# Patient Record
Sex: FEMALE | Race: OTHER | Employment: UNEMPLOYED | URBAN - METROPOLITAN AREA
[De-identification: names, ages, dates, MRNs, and addresses within clinical notes are randomized per-mention and may not be internally consistent; named-entity substitution may affect disease eponyms.]

---

## 2017-03-02 ENCOUNTER — OFFICE VISIT (OUTPATIENT)
Dept: OBGYN CLINIC | Age: 37
End: 2017-03-02

## 2017-03-02 VITALS
HEIGHT: 65 IN | DIASTOLIC BLOOD PRESSURE: 58 MMHG | SYSTOLIC BLOOD PRESSURE: 110 MMHG | BODY MASS INDEX: 25.99 KG/M2 | WEIGHT: 156 LBS

## 2017-03-02 DIAGNOSIS — N92.6 IRREGULAR PERIODS: ICD-10-CM

## 2017-03-02 DIAGNOSIS — R82.90 BAD ODOR OF URINE: ICD-10-CM

## 2017-03-02 DIAGNOSIS — N89.8 VAGINAL ODOR: ICD-10-CM

## 2017-03-02 DIAGNOSIS — D50.9 IRON DEFICIENCY ANEMIA, UNSPECIFIED IRON DEFICIENCY ANEMIA TYPE: ICD-10-CM

## 2017-03-02 DIAGNOSIS — N97.0 INFERTILITY ASSOCIATED WITH ANOVULATION: Primary | ICD-10-CM

## 2017-03-02 LAB
BILIRUB UR QL STRIP: NEGATIVE
GLUCOSE UR-MCNC: NEGATIVE MG/DL
KETONES P FAST UR STRIP-MCNC: NEGATIVE MG/DL
PH UR STRIP: NORMAL [PH] (ref 4.6–8)
PROT UR QL STRIP: NEGATIVE MG/DL
SP GR UR STRIP: NORMAL (ref 1–1.03)
UA UROBILINOGEN AMB POC: NORMAL (ref 0.2–1)
URINALYSIS CLARITY POC: NORMAL
URINALYSIS COLOR POC: NORMAL
URINE BLOOD POC: NEGATIVE
URINE LEUKOCYTES POC: NEGATIVE
URINE NITRITES POC: NEGATIVE

## 2017-03-02 NOTE — PROGRESS NOTES
Femara followup note  Helen Chang is a [id number],  43 y.o. female OTHER Patient's last menstrual period was 2017. who presents for a follow up from taking Femara in November. She comes to the office today with a history of oligo-ovulation. She and her partner have been attempting pregnancy for one year. She is here to followup on Femara therapy. The patient has a history of oligo-ovulation which is her indication for Femara therapy. She is now on day 12 of this cycle. She has taken Femara, 2.5 mg's on day 3 of the cycle for 5 days. The couple have had intercourse 2 to 4 times per week . She has not had previous Femara therapy in the past.   The patient had no symptoms of ovulation on that dose and returns for followup. The patient has been re-advised of the risks, benefits, and alternatives of Femara therapy. Patient states she did have some cramping with IC. She has also noticed a vaginal discharge and foul odor right before her cycle starts. She has noticed this for several months. She states her hgb was 7 at PCP - hx of gastric sleeve. May need iron infusions - will check today  Complains of foul odor to urine and dysuria. No fever. Sx for this week. No hematuria      Past Medical History:   Diagnosis Date    Abnormal Pap smear of cervix 2016    Neg pap +HPV     history     Acquired hypothyroidism     hyperthyroid, graves disease    Anxiety     recent diagnosis    Constipation     Frequent urination     GERD (gastroesophageal reflux disease)     HSV-2 infection 2014    HX OTHER MEDICAL     compression    Infertility     clomid    Leg pain     Morbid obesity ([address]     PCOS (polycystic ovarian syndrome)     Rapid pulse     Reflux     Sinusitis     Snoring     Thyroid disease     Varicose vein      Past Surgical History:   Procedure Laterality Date    HX GASTRIC BYPASS  10/2/13    HX HEENT  orbital surgery    orbital decompression.       Social History Occupational History    Not on file. Social History Main Topics    Smoking status: Never Smoker    Smokeless tobacco: Never Used    Alcohol use No    Drug use: No    Sexual activity: Yes     Partners: Male     Birth control/ protection: None     Family History   Problem Relation Age of Onset    Hypertension Mother     Diabetes Mother     High Cholesterol Mother     Hypertension Father     Diabetes Father     Heart Disease Father     Stroke Father     Hypertension Maternal Grandmother     Diabetes Maternal Grandmother     Hypertension Maternal Grandfather     Diabetes Maternal Grandfather     Stroke Maternal Grandfather     Hypertension Paternal Grandmother     Diabetes Paternal Grandmother     Stroke Paternal Grandmother     Hypertension Paternal Grandfather     Diabetes Paternal Grandfather        No Known Allergies  Prior to Admission medications    Medication Sig Start Date End Date Taking? Authorizing Provider   BIOTIN PO Take  by mouth. Yes Historical Provider   CALCIUM PO Take  by mouth. Yes Historical Provider   cyanocobalamin (VITAMIN B-12) 1,000 mcg/mL injection 1,000 mcg by IntraMUSCular route once. Yes Historical Provider   ergocalciferol (ERGOCALCIFEROL) 50,000 unit capsule Take 1 Cap by mouth every seven (7) days. 9/20/13  Yes Abhilash Villatoro, NP   ALPRAZolam Geoffry Chill) 0.5 mg tablet Take 0.5 mg by mouth as needed. Yes Historical Provider   levothyroxine (LEVOXYL) 112 mcg tablet Take  by mouth daily (before breakfast).    Yes Historical Provider        Review of Systems: History obtained from the patient  Constitutional: negative for weight loss, fever, night sweats  Breast: negative for breast lumps, nipple discharge, galactorrhea  GI: negative for change in bowel habits, abdominal pain, black or bloody stools  : negative for frequency, dysuria, hematuria, vaginal discharge  MSK: negative for back pain, joint pain, muscle pain  Skin: negative for itching, rash, hives  Psych: negative for anxiety, depression, change in mood    Objective:    Visit Vitals    /58    Ht 5' 5\" (1.651 m)    Wt 156 lb (70.8 kg)    LMP 02/18/2017    BMI 25.96 kg/m2       Physical Exam:     Constitutional  · Appearance: well-nourished, well developed, alert, in no acute distress    HENT  · Head and Face: appears normal    Neck  · Inspection/Palpation: normal appearance, no masses or tenderness  · Lymph Nodes: no lymphadenopathy present  · Thyroid: gland size normal, nontender, no nodules or masses present on palpation    Gastrointestinal  · Abdominal Examination: abdomen non-tender to palpation, normal bowel sounds, no masses present  · Liver and spleen: no hepatomegaly present, spleen not palpable  · Hernias: no hernias identified    Genitourinary  · External Genitalia: normal appearance for age, no discharge present, no tenderness present, no inflammatory lesions present, no masses present, no atrophy present  · Vagina: normal vaginal vault without central or paravaginal defects, no discharge present, no inflammatory lesions present, no masses present  · Bladder: non-tender to palpation  · Urethra: appears normal  · Cervix: normal   · Uterus: normal size, shape and consistency  · Adnexa: no adnexal tenderness present, no adnexal masses present  · Perineum: perineum within normal limits, no evidence of trauma, no rashes or skin lesions present  · Anus: anus within normal limits, no hemorrhoids present  · Inguinal Lymph Nodes: no lymphadenopathy present    Skin  · General Inspection: no rash, no lesions identified    Neurologic/Psychiatric  · Mental Status:  · Orientation: grossly oriented to person, place and time  · Mood and Affect: mood normal, affect appropriate  Results for orders placed or performed in visit on 03/02/17   AMB POC URINALYSIS DIP STICK MANUAL W/O MICRO   Result Value Ref Range    Color (UA POC)      Clarity (UA POC)      Glucose (UA POC) Negative Negative    Bilirubin (UA POC) Negative Negative    Ketones (UA POC) Negative Negative    Specific gravity (UA POC)  1.001 - 1.035    Blood (UA POC) Negative Negative    pH (UA POC)  4.6 - 8.0    Protein (UA POC) Negative Negative mg/dL    Urobilinogen (UA POC) normal 0.2 - 1    Nitrites (UA POC) Negative Negative    Leukocyte esterase (UA POC) Negative Negative       Assessment:  Oligoamenorrhea. Infertility - no conception after 2 years  Dysuria  Vaginal ordor  Hx of anemia  Plan:   Nuswab  Urine culture  See RED - conceived after 2 cycles of Clomid last pregnancy, but weighed much more then and did not have reg cycles  nuswab  prog level, CBC today  Instructions given.

## 2017-03-02 NOTE — PATIENT INSTRUCTIONS
Learning About Planning for Future Pregnancy  How can you plan for pregnancy? Even before you get pregnant, you can help make your pregnancy as healthy as possible. Take these steps:  · See a doctor or certified nurse-midwife for an exam. Talk about the medicines, vitamins, and herbs you take. Discuss any health problems or concerns you have. · Don't take nonsteroidal anti-inflammatory drugs (NSAIDs). Examples of these are ibuprofen and aspirin. They can raise your risk of miscarriage. This risk is higher around the time you conceive or if you use them for more than a week. · Take a daily multivitamin or prenatal vitamin. Make sure it has folic acid. This will lower the chance of having a baby with a birth defect. · Keep track of your menstrual cycle. This is a good idea for a few reasons. It helps you know the best time to try to get pregnant. And it can help your doctor or midwife figure out when your baby is due and how it is growing. · Make healthy choices. Eat well. Avoid caffeine. Or cut back and only have 1 cup of coffee or tea a day. Avoid alcohol, cigarettes, and illegal drugs. Take only the medicines your doctor or midwife says are okay. · Get plenty of exercise. A strong body will make pregnancy and birth easier. It will also help you recover after the birth. And exercise can help improve your mood. What other exams or tests should you have? Before trying to get pregnant, take care of any other health concerns you might have. · If you have diabetes or high blood pressure or you are obese, talk to your doctor before you get pregnant. Together, you can make a plan about how to control these health problems. · Talk with your doctor about any medicines you take. Find out if it is safe to keep taking them while you are pregnant. · See your dentist. Take care of any dental work you may need. · Get any vaccines you might need.  They can help prevent birth defects, miscarriage, or stillbirth that can be caused by infections such as rubella or measles. Ask your doctor how long you should wait after you get a vaccine before you try to get pregnant. · Talk with your doctor about whether to have screening tests for diseases that are passed down through families (genetic disorders). These diseases include:  ¨ Cystic fibrosis. ¨ Sickle cell disease. ¨ Lucio-Sachs disease. If you think you might be pregnant  · You can use a home pregnancy test as soon as the first day of your first missed menstrual period. · As soon as you know you're pregnant, make an appointment with your doctor or certified nurse-midwife. Your first prenatal visit will provide information that can be used to check for any problems as your pregnancy progresses. Where can you learn more? Go to http://jaylene-lor.info/. Enter X535 in the search box to learn more about \"Learning About Planning for Future Pregnancy. \"  Current as of: May 30, 2016  Content Version: 11.1  © 6710-5449 [employer], [employer]. Care instructions adapted under license by [employer] (which disclaims liability or warranty for this information). If you have questions about a medical condition or this instruction, always ask your healthcare professional. [address] any warranty or liability for your use of this information.

## 2017-03-03 LAB
ERYTHROCYTE [DISTWIDTH] IN BLOOD BY AUTOMATED COUNT: 17.1 % (ref 12.3–15.4)
HCT VFR BLD AUTO: 33.2 % (ref 34–46.6)
HGB BLD-MCNC: 10 G/DL (ref 11.1–15.9)
MCH RBC QN AUTO: 21.4 PG (ref 26.6–33)
MCHC RBC AUTO-ENTMCNC: 30.1 G/DL (ref 31.5–35.7)
MCV RBC AUTO: 71 FL (ref 79–97)
PLATELET # BLD AUTO: 335 X10E3/UL (ref 150–379)
PROGEST SERPL-MCNC: 12.8 NG/ML
RBC # BLD AUTO: 4.68 X10E6/UL (ref 3.77–5.28)
WBC # BLD AUTO: 8.9 X10E3/UL (ref 3.4–10.8)

## 2017-03-07 ENCOUNTER — PATIENT MESSAGE (OUTPATIENT)
Dept: OBGYN CLINIC | Age: 37
End: 2017-03-07

## 2017-03-07 LAB
A VAGINAE DNA VAG QL NAA+PROBE: ABNORMAL SCORE
BVAB2 DNA VAG QL NAA+PROBE: ABNORMAL SCORE
C ALBICANS DNA VAG QL NAA+PROBE: POSITIVE
C GLABRATA DNA VAG QL NAA+PROBE: NEGATIVE
MEGA1 DNA VAG QL NAA+PROBE: ABNORMAL SCORE
T VAGINALIS RRNA SPEC QL NAA+PROBE: NEGATIVE

## 2017-03-07 RX ORDER — FLUCONAZOLE 150 MG/1
150 TABLET ORAL DAILY
Qty: 1 TAB | Refills: 0 | Status: SHIPPED | OUTPATIENT
Start: 2017-03-07 | End: 2017-03-08

## 2017-03-07 RX ORDER — METRONIDAZOLE 500 MG/1
500 TABLET ORAL 2 TIMES DAILY
Qty: 14 TAB | Refills: 0 | Status: SHIPPED | OUTPATIENT
Start: 2017-03-07 | End: 2017-03-14

## 2017-03-13 NOTE — TELEPHONE ENCOUNTER
Left detailed message:  She is scheduled to see [facility] on Monday 4/10/17 at 2pm.  If she needs to reschedule, their # is [phone].

## 2017-03-17 ENCOUNTER — TELEPHONE (OUTPATIENT)
Dept: OBGYN CLINIC | Age: 37
End: 2017-03-17

## 2017-03-17 NOTE — TELEPHONE ENCOUNTER
Pt returned call and was notified of her lab results per MD recommendation. Informed pt that 2 medications Diflucan and Flagyl was sent to her pharmacy. She verbalized understanding.

## 2017-08-03 ENCOUNTER — TELEPHONE (OUTPATIENT)
Dept: SURGERY | Age: 37
End: 2017-08-03

## 2018-02-08 LAB
ANTIBODY SCREEN, EXTERNAL: NORMAL
CHLAMYDIA, EXTERNAL: NORMAL
HBSAG, EXTERNAL: NORMAL
HCT, EXTERNAL: 41.2
HGB, EXTERNAL: 13.3
HIV, EXTERNAL: NORMAL
N. GONORRHEA, EXTERNAL: NORMAL
PLATELET CNT,   EXTERNAL: 231
RPR, EXTERNAL: NON REACTIVE
RUBELLA, EXTERNAL: NORMAL
TYPE, ABO & RH, EXTERNAL: NORMAL

## 2018-03-16 ENCOUNTER — OFFICE VISIT (OUTPATIENT)
Dept: OBGYN CLINIC | Age: 38
End: 2018-03-16

## 2018-03-16 VITALS — BODY MASS INDEX: 27.36 KG/M2 | HEIGHT: 65 IN | WEIGHT: 164.2 LBS

## 2018-03-16 DIAGNOSIS — O09.529 ENCOUNTER FOR SUPERVISION OF HIGH-RISK PREGNANCY WITH ELDERLY MULTIGRAVIDA: Primary | ICD-10-CM

## 2018-03-16 DIAGNOSIS — Z13.79 ENCOUNTER FOR OTHER SCREENING FOR GENETIC AND CHROMOSOMAL ANOMALIES: ICD-10-CM

## 2018-03-16 LAB
CYSTIC FIBROSIS, EXTERNAL: NORMAL
HGB EVAL, EXTERNAL: NORMAL
PAP SMEAR, EXTERNAL: NORMAL
URINALYSIS, EXTERNAL: NORMAL

## 2018-03-16 NOTE — PROGRESS NOTES
Current pregnancy history:    Rahul August is a 42 y.o. female who presents for the evaluation of pregnancy. Patient's last menstrual period was 2017 (exact date). LMP history:  The date of her LMP is  certain. Her last menstrual period was normal and lasted for 4 to 5 days. A urine pregnancy test was positive 6 weeks ago. She was not on the pill at conception. Based on her LMP, her EDC is 2018 and her EGA is 11 weeks,6 days. Her menstrual cycles are regular and occur approximately every 28 days  and range from 3 to 5 days. The last menses lasted  the usual number of days. Ultrasound data:  She had an  ultrasound done by the ultrasound tech at [facility] 2018 which revealed a viable elizalde pregnancy with a gestational age of 7 weeks and 6 days giving an [address] of 2018    Pregnancy symptoms:    Since her LMP she has experienced  urinary frequency, breast tenderness, and nausea. Associated signs and symptoms which she denies: dysuria, discharge, vaginal bleeding. She states she has gained weight:  Approximately 5 pounds over the last few weeks. Relevant past pregnancy history:   She has the following pregnancy history: Her last pregnancy was uncomplicated. She has no history of  delivery. SAB X1   TAB X1    Relevant past medical history:(relevant to this pregnancy): noncontributory. Hx of gastric sleeve  Hx of anemia/iron transfusions  Hypothyroid, She sees [facility]  She has received flu vaccine already  +Ecoli on 18, Treated abx X7 days         Pap/Occupational history:  Last pap smear: 2018  Results: Normal, HPV not tested. Hx of +HPV in 2016    Her occupation is: homemaker    Substance history: negative for alcohol, tobacco and street drugs. Positive for nothing. Exposure history: There are no indoor cat/s in the home. The patient was instructed to not change the cat litter.    She denies close contact with children on a regular basis. She has had chicken pox or the vaccine in the past.   Patient denies issues with domestic violence. Genetic Screening/Teratology Counseling: (Includes patient, baby's father, or anyone in either family with:)  3.  Patient's age >/= 28 at [facility]?-- 42 at delivery . 2. Thalassemia ([facility] [place], [address], or  background): MCV<80?--no.     3.  Neural tube defect (meningomyelocele, spina bifida, anencephaly)?--no.   4.  Congenital heart defect?--no.  5.  Down syndrome?--no.   6.  Lucio-Sachs ([employer] [place])?--no.   7.  Canavan's Disease?--no.   8.  Familial Dysautonomia?--no.   9.  Sickle cell disease or trait ()? --no   The patient has not been tested for sickle trait  10. Hemophilia or other blood disorders?--no. 11.  Muscular dystrophy?--no. 12.  Cystic fibrosis?--no. 13.  [place]'s Chorea?--no. 14.  Mental retardation/autism (if yes was person tested for Fragile X)?--no. 15.  Other inherited genetic or chromosomal disorder?--no. 12.  Maternal metabolic disorder (DM, PKU, etc)?--no. 17.  Patient or FOB with a child with a birth defect not listed above?--no.  17a. Patient or FOB with a birth defect themselves?--no. 18.  Recurrent pregnancy loss, or stillbirth?--no. 19.  Any medications since LMP other than prenatal vitamins (include vitamins,  supplements, OTC meds, drugs, alcohol)?--no. 20.  Any other genetic/environmental exposure to discuss?--no. Infection History:  1. Lives with someone with TB or TB exposed?--no.   2.  Patient or partner has history of genital herpes?--yes - no outbreak  3. Rash or viral illness since LMP?--no.    4.  History of STD (GC, CT, HPV, syphilis, HIV)? --no   5. Other: OTHER?       Obstetric History       T1      L1     SAB1   TAB1   Ectopic0   Molar0   Multiple0   Live Births1       # Outcome Date GA Lbr Raz/2nd Weight Sex Delivery Anes PTL Lv   4 Term 11 40w6d  7 lb 5 oz (3.317 kg) F Vag-Spont         Apgar1:  8                Apgar5: 8   3             2 SAB            1 TAB                       Past Medical History:   Diagnosis Date    Abnormal Pap smear of cervix 2016    Neg pap +HPV     history     Acquired hypothyroidism     hyperthyroid, graves disease    Anxiety     recent diagnosis    Constipation     Frequent urination     GERD (gastroesophageal reflux disease)     HSV-2 infection 2014    HX OTHER MEDICAL     compression    Infertility     clomid    Leg pain     Morbid obesity ([address]     PCOS (polycystic ovarian syndrome)     Rapid pulse     Reflux     Sinusitis     Snoring     Thyroid disease     Varicose vein      Past Surgical History:   Procedure Laterality Date    HX GASTRIC BYPASS  10/2/13    HX HEENT  orbital surgery    orbital decompression. Social History     Occupational History    Not on file. Social History Main Topics    Smoking status: Never Smoker    Smokeless tobacco: Never Used    Alcohol use No    Drug use: No    Sexual activity: Yes     Partners: Male     Birth control/ protection: None     Family History   Problem Relation Age of Onset    Hypertension Mother     Diabetes Mother     High Cholesterol Mother     Hypertension Father     Diabetes Father     Heart Disease Father     Stroke Father     Hypertension Maternal Grandmother     Diabetes Maternal Grandmother     Hypertension Maternal Grandfather     Diabetes Maternal Grandfather     Stroke Maternal Grandfather     Hypertension Paternal Grandmother     Diabetes Paternal Grandmother     Stroke Paternal Grandmother     Hypertension Paternal Grandfather     Diabetes Paternal Grandfather        No Known Allergies  Prior to Admission medications    Medication Sig Start Date End Date Taking? Authorizing Provider   levothyroxine (LEVOXYL) 112 mcg tablet Take  by mouth daily (before breakfast). Yes Historical Provider   BIOTIN PO Take  by mouth. Historical Provider   CALCIUM PO Take  by mouth. Historical Provider   cyanocobalamin (VITAMIN B-12) 1,000 mcg/mL injection 1,000 mcg by IntraMUSCular route once. Historical Provider   ergocalciferol (ERGOCALCIFEROL) 50,000 unit capsule Take 1 Cap by mouth every seven (7) days. 9/20/13   Faustino Frias NP   ALPRAZolam Bevely Kalyan) 0.5 mg tablet Take 0.5 mg by mouth as needed.     Historical Provider        Review of Systems: History obtained from the patient  Constitutional: negative for weight loss, fever, night sweats  HEENT: negative for hearing loss, earache, congestion, snoring, sorethroat  CV: negative for chest pain, palpitations, edema  Resp: negative for cough, shortness of breath, wheezing  Breast: negative for breast lumps, nipple discharge, galactorrhea  GI: negative for change in bowel habits, abdominal pain, black or bloody stools  : negative for frequency, dysuria, hematuria, vaginal discharge  MSK: negative for back pain, joint pain, muscle pain  Skin: negative for itching, rash, hives  Neuro: negative for dizziness, headache, confusion, weakness  Psych: negative for anxiety, depression, change in mood  Heme/lymph: negative for bleeding, bruising, pallor    Objective:  Visit Vitals    Ht 5' 5\" (1.651 m)    Wt 164 lb 3.2 oz (74.5 kg)    LMP 12/23/2017 (Exact Date)    BMI 27.32 kg/m2       Physical Exam:   PHYSICAL EXAMINATION    Constitutional  · Appearance: well-nourished, well developed, alert, in no acute distress    HENT  · Head  · Face: appears normal  · Eyes: appear normal  · Ears: normal  · Mouth: normal  · Lips: no lesions    Neck  · Inspection/Palpation: normal appearance, no masses or tenderness  · Lymph Nodes: no lymphadenopathy present  · Thyroid: gland size normal, nontender, no nodules or masses present on palpation    Chest  · Respiratory Effort: breathing unlabored  · Auscultation: normal breath sounds    Cardiovascular  · Heart:  · Auscultation: regular rate and rhythm without murmur    Breasts  · Inspection of Breasts: breasts symmetrical, no skin changes, no discharge present, nipple appearance normal, no skin retraction present  · Palpation of Breasts and Axillae: no masses present on palpation, no breast tenderness  · Axillary Lymph Nodes: no lymphadenopathy present    Gastrointestinal  · Abdominal Examination: abdomen non-tender to palpation, normal bowel sounds, no masses present  · Liver and spleen: no hepatomegaly present, spleen not palpable  · Hernias: no hernias identified    Genitourinary  · External Genitalia: normal appearance for age, no discharge present, no tenderness present, no inflammatory lesions present, no masses present, no atrophy present  · Vagina: normal vaginal vault without central or paravaginal defects, no discharge present, no inflammatory lesions present, no masses present  · Bladder: non-tender to palpation  · Urethra: appears normal  · Cervix: normal   · Uterus: enlarged, normal shape, soft  · Adnexa: no adnexal tenderness present, no adnexal masses present  · Perineum: perineum within normal limits, no evidence of trauma, no rashes or skin lesions present  · Anus: anus within normal limits, no hemorrhoids present  · Inguinal Lymph Nodes: no lymphadenopathy present    Skin  · General Inspection: no rash, no lesions identified    Neurologic/Psychiatric  · Mental Status:  · Orientation: grossly oriented to person, place and time  · Mood and Affect: mood normal, affect appropriate    Assessment:   Intrauterine pregnancy with the following problems identified:  EDC 9/29/2018 by D=  Hypothyroid - endocrine  UTI - E.  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  [facility] 4 weeks prior to conception - no symptoms    Plan:     Offered CF testing, CVS, Nuchal Translucency, MSAFP, amnio, and discussed NIPT  Course of pregnancy discussed including visit schedule, routine U/S, glucola testing, etc.  Avoid alcoholic beverages and illicit/recreational drugs use  Take prenatal vitamins or folic acid daily. Hospital and practice style discussed with coverage system. Discussed nutrition, toxoplasmosis precautions, sexual activity, exercise, need for influenza vaccine, environmental and work hazards, travel advice, screen for domestic violence, need for seat belts. Discussed seafood, unpasteurized dairy products, deli meat, artificial sweeteners, and caffeine. Information on prenatal classes/breastfeeding given. Information on circumcision given  Patient encouraged not to smoke. Discussed current prescription drug use. Given medication list.  Discussed the use of over the counter medications and chemicals. Route of delivery discussed, including risks, benefits, and alternatives of  versus repeat LTCS. Pt understands risk of hemorrhage during pregnancy and post delivery and would accept blood products if necessary in life-threatening emergencies  Repeat urine culture. NIPTS today. Inheritest. See MFM. hemoglobinopathy    Handouts given to pt.

## 2018-03-16 NOTE — PATIENT INSTRUCTIONS

## 2018-03-17 LAB — BACTERIA UR CULT: NORMAL

## 2018-03-20 ENCOUNTER — HOSPITAL ENCOUNTER (OUTPATIENT)
Dept: PERINATAL CARE | Age: 38
Discharge: HOME | End: 2018-03-20
Attending: OBSTETRICS & GYNECOLOGY
Payer: COMMERCIAL

## 2018-03-20 DIAGNOSIS — O09.522 SUPERVISION OF ELDERLY MULTIGRAVIDA IN SECOND TRIMESTER: ICD-10-CM

## 2018-03-20 LAB
CYTOLOGIST CVX/VAG CYTO: NORMAL
CYTOLOGY CVX/VAG DOC THIN PREP: NORMAL
CYTOLOGY HISTORY:: NORMAL
DX ICD CODE: NORMAL
HGB A MFR BLD: 97.6 % (ref 96.4–98.8)
HGB A2 MFR BLD COLUMN CHROM: 2.4 % (ref 1.8–3.2)
HGB C MFR BLD: 0 %
HGB F MFR BLD: 0 % (ref 0–2)
HGB FRACT BLD-IMP: NORMAL
HGB OTHER MFR BLD HPLC: 0 %
HGB S BLD QL SOLY: NEGATIVE
HGB S MFR BLD: 0 %
HPV I/H RISK 1 DNA CVX QL PROBE+SIG AMP: NEGATIVE
Lab: NORMAL
OTHER STN SPEC: NORMAL
PATH REPORT.FINAL DX SPEC: NORMAL
STAT OF ADQ CVX/VAG CYTO-IMP: NORMAL

## 2018-03-20 PROCEDURE — 76813 OB US NUCHAL MEAS 1 GEST: CPT | Performed by: OBSTETRICS & GYNECOLOGY

## 2018-03-28 LAB
CLINICAL INFO: NORMAL
ETHNIC BACKGROUND STATED: NORMAL
GENE MUT TESTED BLD/T: NORMAL
GENETIC COUNSELOR, 150282: NORMAL
INDICATION: NORMAL
LAB DIRECTOR NAME PROVIDER: NORMAL
MOL DX INTERP BLD/T QL: NORMAL
REF LAB TEST METHOD: NORMAL
SERVICE CMNT 02-IMP: NORMAL
SPECIMEN SOURCE: NORMAL
TEST PERFORMANCE INFO SPEC: NORMAL

## 2018-04-13 ENCOUNTER — ROUTINE PRENATAL (OUTPATIENT)
Dept: OBGYN CLINIC | Age: 38
End: 2018-04-13

## 2018-04-13 VITALS — BODY MASS INDEX: 28.12 KG/M2 | WEIGHT: 169 LBS | DIASTOLIC BLOOD PRESSURE: 66 MMHG | SYSTOLIC BLOOD PRESSURE: 110 MMHG

## 2018-04-13 DIAGNOSIS — Z3A.15 15 WEEKS GESTATION OF PREGNANCY: ICD-10-CM

## 2018-04-13 DIAGNOSIS — Z34.80 SUPERVISION OF OTHER NORMAL PREGNANCY, ANTEPARTUM: Primary | ICD-10-CM

## 2018-04-13 LAB — AFP, MATERNAL, EXTERNAL: NORMAL

## 2018-04-13 NOTE — PATIENT INSTRUCTIONS
Weeks 14 to 18 of Your Pregnancy: Care Instructions  Your Care Instructions    During this time, you may start to \"show,\" so that you look pregnant to people around you. You may also notice some changes in your skin, such as itchy spots on your palms or acne on your face. Your baby is now able to pass urine, and your baby's first stool (meconium) is starting to collect in his or her intestines. Hair is also beginning to grow on your baby's head. At your next visit, between weeks 18 and 20, your doctor may do an ultrasound test. The test allows your doctor to check for certain problems. Your doctor can also tell the sex of your baby. This is a good time to think about whether you want to know whether your baby is a boy or a girl. Talk to your doctor about getting a flu shot to help keep you healthy during your pregnancy. As your pregnancy moves along, it is common to worry or feel anxious. Your body is changing a lot. And you are thinking about giving birth, the health of your baby, and becoming a parent. You can learn to cope with any anxiety and stress you feel. Follow-up care is a key part of your treatment and safety. Be sure to make and go to all appointments, and call your doctor if you are having problems. It's also a good idea to know your test results and keep a list of the medicines you take. How can you care for yourself at home? ?Reduce stress  ? · Ask for help with cooking and housekeeping. ? · Figure out who or what causes your stress. Avoid these people or situations as much as possible. ? · Relax every day. Taking 10- to 15-minute breaks can make a big difference. Take a walk, listen to music, or take a warm bath. ? · Learn relaxation techniques at prenatal or yoga class. Or buy a relaxation tape. ? · List your fears about having a baby and becoming a parent. Share the list with someone you trust. Decide which worries are really small, and try to let them go. Exercise  ?  · If you did not exercise much before pregnancy, start slowly. Walking is best. Bryce Bake yourself, and do a little more every day. ? · Brisk walking, easy jogging, low-impact aerobics, water aerobics, and yoga are good choices. Some sports, such as scuba diving, horseback riding, downhill skiing, gymnastics, and water skiing, are not a good idea. ? · Try to do at least 2½ hours a week of moderate exercise, such as a fast walk. One way to do this is to be active 30 minutes a day, at least 5 days a week. It's fine to be active in blocks of 10 minutes or more throughout your day and week. ? · Wear loose clothing. And wear shoes and a bra that provide good support. ? · Warm up and cool down to start and finish your exercise. ? · If you want to use weights, be sure to use light weights. They reduce stress on your joints. ?Stay at the best weight for you  ? · Experts recommend that you gain about 1 pound a month during the first 3 months of your pregnancy. ? · Experts recommend that you gain about 1 pound a week during your last 6 months of pregnancy, for a total weight gain of 25 to 35 pounds. ? · If you are underweight, you will need to gain more weight (about 28 to 40 pounds). ? · If you are overweight, you may not need to gain as much weight (about 15 to 25 pounds). ? · If you are gaining weight too fast, use common sense. Exercise every day, and limit sweets, fast foods, and fats. Choose lean meats, fruits, and vegetables. ? · If you are having twins or more, your doctor may refer you to a dietitian. Where can you learn more? Go to http://jaylene-lor.info/. Enter V831 in the search box to learn more about \"Weeks 14 to 18 of Your Pregnancy: Care Instructions. \"  Current as of: March 16, 2017  Content Version: 11.4  © 6338-0216 [employer]. Care instructions adapted under license by [employer] (which disclaims liability or warranty for this information).  If you have questions about a medical condition or this instruction, always ask your healthcare professional. [place] [address] any warranty or liability for your use of this information.

## 2018-04-13 NOTE — PROGRESS NOTES
Problem List  Date Reviewed: 3/16/2018          Codes Class Noted    Supervision of elderly multigravida in second trimester ICD-10-CM: O09.522  ICD-9-CM: V23.82  3/20/2018    Overview Addendum 3/29/2018  2:22 PM by Berny Kim LPN     Intrauterine pregnancy with the following problems identified:  [facility] 9/29/2018 by D=US  Hypothyroid - endocrine  UTI - E.  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  [place] 4 weeks prior to conception - no symptoms  Inheritest- normal               Dizziness ICD-10-CM: R42  ICD-9-CM: 780.4  8/6/2015        Fatigue ICD-10-CM: R53.83  ICD-9-CM: 780.79  8/6/2015        Snoring ICD-10-CM: R06.83  ICD-9-CM: 786.09  7/8/2013        Pregnancy ICD-10-CM: Z34.90  ICD-9-CM: V22.2  8/27/2011        Active labor ICD-10-CM: [id number]  ICD-9-CM: Dilcia Churches  8/27/2011        Reflux ICD-10-CM: K21.9  ICD-9-CM: 530.81  Unknown        Constipation ICD-10-CM: 564.0  ICD-9-CM: 564.0  Unknown        Sinusitis ICD-10-CM: J32.9  ICD-9-CM: 473.9  Unknown        Morbid obesity ([address] ICD-10-CM: E66.01  ICD-9-CM: 278.01  Unknown        Polycystic ovarian syndrome ICD-10-CM: E28.2  ICD-9-CM: 256.4  11/1/2010        Infertility female ICD-9-CM: 628.9  11/1/2010        Urinary incontinence ICD-10-CM: R32  ICD-9-CM: 788.30  11/1/2010        Heartburn ICD-10-CM: R12  ICD-9-CM: 787.1  11/1/2010        SOB (shortness of breath) on exertion ICD-10-CM: R06.02  ICD-9-CM: 786.05  11/1/2010        Hypothyroidism ICD-10-CM: E03.9  ICD-9-CM: 244.9  11/1/2010        Low back pain ICD-10-CM: M54.5  ICD-9-CM: 724.2  11/1/2010

## 2018-04-17 LAB
AFP ADJ MOM SERPL: 0.59
AFP INTERP SERPL-IMP: NORMAL
AFP INTERP SERPL-IMP: NORMAL
AFP SERPL-MCNC: 20.4 NG/ML
AGE AT DELIVERY: 39.8 YR
COMMENT, 018013: NORMAL
GA METHOD: NORMAL
GA: 16.9 WEEKS
IDDM PATIENT QL: NO
MULTIPLE PREGNANCY: NO
NEURAL TUBE DEFECT RISK FETUS: NORMAL %
RESULTS, 017004: NORMAL

## 2018-04-18 DIAGNOSIS — O09.522 SUPERVISION OF ELDERLY MULTIGRAVIDA IN SECOND TRIMESTER: ICD-10-CM

## 2018-05-10 ENCOUNTER — TELEPHONE (OUTPATIENT)
Dept: OBGYN CLINIC | Age: 38
End: 2018-05-10

## 2018-05-10 NOTE — TELEPHONE ENCOUNTER
Patient calling stating that she had the panorama testing done and now her insurance needs a letter of medical necessity. Patient reports her insurance will cover the testing, but has to have the letter first.  Patient would like to have Dr. Tawny Gray to complete this letter and she wants to pick it up as soon as possible. Her insurance is charging her $8,000.00 for this test.  I have given the patient Frances Alba' number for her to speak with her too.      Patient can be reached at [phone]

## 2018-05-10 NOTE — TELEPHONE ENCOUNTER
Patient aware of MD recommendations, but patient is upset stating that she was advised that her insurance company will cover the testing once the letter of Medical Necessity is received. It needs to state why she is having the test [facility]). Patient would like to  the letter. She has spoken with Mamadou Cabezas and she states that she can get it covered cheaper than 200.00 if they receive the letter.     Patient can be reached at [phone]

## 2018-05-10 NOTE — TELEPHONE ENCOUNTER
Tell patient to ignore the bill if it was panorama. They always send to insurance first, but if denied she will be charged what arsen told her.

## 2018-05-15 ENCOUNTER — ROUTINE PRENATAL (OUTPATIENT)
Dept: OBGYN CLINIC | Age: 38
End: 2018-05-15

## 2018-05-15 ENCOUNTER — HOSPITAL ENCOUNTER (OUTPATIENT)
Dept: PERINATAL CARE | Age: 38
Discharge: HOME | End: 2018-05-15
Attending: OBSTETRICS & GYNECOLOGY
Payer: COMMERCIAL

## 2018-05-15 VITALS
SYSTOLIC BLOOD PRESSURE: 112 MMHG | WEIGHT: 177 LBS | BODY MASS INDEX: 29.49 KG/M2 | DIASTOLIC BLOOD PRESSURE: 66 MMHG | HEIGHT: 65 IN

## 2018-05-15 DIAGNOSIS — O09.522 SUPERVISION OF ELDERLY MULTIGRAVIDA IN SECOND TRIMESTER: ICD-10-CM

## 2018-05-15 DIAGNOSIS — O09.522 ENCOUNTER FOR SUPERVISION OF MULTIGRAVIDA OF ADVANCED MATERNAL AGE IN SECOND TRIMESTER: Primary | ICD-10-CM

## 2018-05-15 DIAGNOSIS — Z3A.20 20 WEEKS GESTATION OF PREGNANCY: ICD-10-CM

## 2018-05-15 PROCEDURE — 76811 OB US DETAILED SNGL FETUS: CPT | Performed by: OBSTETRICS & GYNECOLOGY

## 2018-05-15 NOTE — PROGRESS NOTES
Problem List  Date Reviewed: 5/15/2018          Codes Class Noted    Supervision of elderly multigravida in second trimester ICD-10-CM: O09.522  ICD-9-CM: V23.82  3/20/2018    Overview Addendum 5/15/2018 11:42 AM by Shavon Pickering MD     Intrauterine pregnancy with the following problems identified:  [facility] 9/29/2018 by D=US  Hypothyroid - endocrine, hx of graves disease  UTI - E. 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  eval neg  Inheritest- normal  Panorama low risk-female 3/16/18; MSAFP neg                 Dizziness ICD-10-CM: R42  ICD-9-CM: 780.4  8/6/2015        Fatigue ICD-10-CM: R53.83  ICD-9-CM: 780.79  8/6/2015        Snoring ICD-10-CM: R06.83  ICD-9-CM: 786.09  7/8/2013        Pregnancy ICD-10-CM: Z34.90  ICD-9-CM: V22.2  8/27/2011        Active labor ICD-10-CM: [id number]  ICD-9-CM: Rella Bassett  8/27/2011        Reflux ICD-10-CM: K21.9  ICD-9-CM: 530.81  Unknown        Constipation ICD-10-CM: 564.0  ICD-9-CM: 564.0  Unknown        Sinusitis ICD-10-CM: J32.9  ICD-9-CM: 473.9  Unknown        Morbid obesity ([address] ICD-10-CM: E66.01  ICD-9-CM: 278.01  Unknown        Polycystic ovarian syndrome ICD-10-CM: E28.2  ICD-9-CM: 256.4  11/1/2010        Infertility female ICD-9-CM: 628.9  11/1/2010        Urinary incontinence ICD-10-CM: R32  ICD-9-CM: 788.30  11/1/2010        Heartburn ICD-10-CM: R12  ICD-9-CM: 787.1  11/1/2010        SOB (shortness of breath) on exertion ICD-10-CM: R06.02  ICD-9-CM: 786.05  11/1/2010        Hypothyroidism ICD-10-CM: E03.9  ICD-9-CM: 244.9  11/1/2010        Low back pain ICD-10-CM: M54.5  ICD-9-CM: 724.2  11/1/2010

## 2018-05-15 NOTE — PATIENT INSTRUCTIONS

## 2018-05-15 NOTE — PROGRESS NOTES
Problem List  Date Reviewed: 4/13/2018          Codes Class Noted    Supervision of elderly multigravida in second trimester ICD-10-CM: O09.522  ICD-9-CM: V23.82  3/20/2018    Overview Addendum 4/18/2018  2:53 PM by Jaz Elise     Intrauterine pregnancy with the following problems identified:  [facility] 9/29/2018 by D=US  Hypothyroid - endocrine  UTI - E.  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  [place] 4 weeks prior to conception - no symptoms  Inheritest- normal  Panorama low risk-female 3/16/18; MSAFP neg                Dizziness ICD-10-CM: R42  ICD-9-CM: 780.4  8/6/2015        Fatigue ICD-10-CM: R53.83  ICD-9-CM: 780.79  8/6/2015        Snoring ICD-10-CM: R06.83  ICD-9-CM: 786.09  7/8/2013        Pregnancy ICD-10-CM: Z34.90  ICD-9-CM: V22.2  8/27/2011        Active labor ICD-10-CM: [id number]  ICD-9-CM: Mushtaq Wyatt  8/27/2011        Reflux ICD-10-CM: K21.9  ICD-9-CM: 530.81  Unknown        Constipation ICD-10-CM: 564.0  ICD-9-CM: 564.0  Unknown        Sinusitis ICD-10-CM: J32.9  ICD-9-CM: 473.9  Unknown        Morbid obesity ([facility] [address] ICD-10-CM: E66.01  ICD-9-CM: 278.01  Unknown        Polycystic ovarian syndrome ICD-10-CM: E28.2  ICD-9-CM: 256.4  11/1/2010        Infertility female ICD-9-CM: 628.9  11/1/2010        Urinary incontinence ICD-10-CM: R32  ICD-9-CM: 788.30  11/1/2010        Heartburn ICD-10-CM: R12  ICD-9-CM: 787.1  11/1/2010        SOB (shortness of breath) on exertion ICD-10-CM: R06.02  ICD-9-CM: 786.05  11/1/2010        Hypothyroidism ICD-10-CM: E03.9  ICD-9-CM: 244.9  11/1/2010        Low back pain ICD-10-CM: M54.5  ICD-9-CM: 724.2  11/1/2010

## 2018-06-12 ENCOUNTER — ROUTINE PRENATAL (OUTPATIENT)
Dept: OBGYN CLINIC | Age: 38
End: 2018-06-12

## 2018-06-12 VITALS — SYSTOLIC BLOOD PRESSURE: 108 MMHG | WEIGHT: 179.6 LBS | DIASTOLIC BLOOD PRESSURE: 64 MMHG | BODY MASS INDEX: 29.89 KG/M2

## 2018-06-12 DIAGNOSIS — O09.522 ENCOUNTER FOR SUPERVISION OF MULTIGRAVIDA OF ADVANCED MATERNAL AGE IN SECOND TRIMESTER: Primary | ICD-10-CM

## 2018-06-12 NOTE — PATIENT INSTRUCTIONS
Weeks 22 to 26 of Your Pregnancy: Care Instructions  Your Care Instructions    As you enter your 7th month of pregnancy at week 26, your baby's lungs are growing stronger and getting ready to breathe. You may notice that your baby responds to the sound of your or your partner's voice. You may also notice that your baby does less turning and twisting and more squirming or jerking. Jerking often means that your baby has the hiccups. Hiccups are perfectly normal and are only temporary. You may want to think about attending a childbirth preparation class. This is also a good time to start thinking about whether you want to have pain medicine during labor. Most pregnant women are tested for gestational diabetes between weeks 25 and 28. Gestational diabetes occurs when your blood sugar level gets too high when you're pregnant. The test is important, because you can have gestational diabetes and not know it. But the condition can cause problems for your baby. Follow-up care is a key part of your treatment and safety. Be sure to make and go to all appointments, and call your doctor if you are having problems. It's also a good idea to know your test results and keep a list of the medicines you take. How can you care for yourself at home? Ease discomfort from your baby's kicking  · Change your position. Sometimes this will cause your baby to change position too. · Take a deep breath while you raise your arm over your head. Then breathe out while you drop your arm. Do Kegel exercises to prevent urine from leaking  · You can do Kegel exercises while you stand or sit. ¨ Squeeze the same muscles you would use to stop your urine. Your belly and thighs should not move. ¨ Hold the squeeze for 3 seconds, and then relax for 3 seconds. ¨ Start with 3 seconds. Then add 1 second each week until you are able to squeeze for 10 seconds. ¨ Repeat the exercise 10 to 15 times for each session.  Do three or more sessions each day.  Ease or reduce swelling in your feet, ankles, hands, and fingers  · If your fingers are puffy, take off your rings. · Do not eat high-salt foods, such as potato chips. · Prop up your feet on a stool or couch as much as possible. Sleep with pillows under your feet. · Do not stand for long periods of time or wear tight shoes. · Wear support stockings. Where can you learn more? Go to http://jaylene-lor.info/. Enter G264 in the search box to learn more about \"Weeks 22 to 26 of Your Pregnancy: Care Instructions. \"  Current as of: March 16, 2017  Content Version: 11.4  © 0579-6786 [employer]. Care instructions adapted under license by [employer] (which disclaims liability or warranty for this information). If you have questions about a medical condition or this instruction, always ask your healthcare professional. [place] [address] any warranty or liability for your use of this information.

## 2018-06-12 NOTE — PROGRESS NOTES
Problem List  Date Reviewed: 5/15/2018          Codes Class Noted    Supervision of elderly multigravida in second trimester ICD-10-CM: O09.522  ICD-9-CM: V23.82  3/20/2018    Overview Addendum 5/15/2018 11:42 AM by Caitlyn Antoine MD     Intrauterine pregnancy with the following problems identified:  [facility] 9/29/2018 by D=US  Hypothyroid - endocrine, hx of graves disease  UTI - E. 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  eval neg  Inheritest- normal  [place] low risk-female 3/16/18; MSAFP neg                 Dizziness ICD-10-CM: R42  ICD-9-CM: 780.4  8/6/2015        Fatigue ICD-10-CM: R53.83  ICD-9-CM: 780.79  8/6/2015        Snoring ICD-10-CM: R06.83  ICD-9-CM: 786.09  7/8/2013        Pregnancy ICD-10-CM: Z34.90  ICD-9-CM: V22.2  8/27/2011        Active labor ICD-10-CM: [id number]  ICD-9-CM: Adrianne Crocker  8/27/2011        Reflux ICD-10-CM: K21.9  ICD-9-CM: 530.81  Unknown        Constipation ICD-10-CM: 564.0  ICD-9-CM: 564.0  Unknown        Sinusitis ICD-10-CM: J32.9  ICD-9-CM: 473.9  Unknown        Morbid obesity ([address] ICD-10-CM: E66.01  ICD-9-CM: 278.01  Unknown        Polycystic ovarian syndrome ICD-10-CM: E28.2  ICD-9-CM: 256.4  11/1/2010        Infertility female ICD-9-CM: 628.9  11/1/2010        Urinary incontinence ICD-10-CM: R32  ICD-9-CM: 788.30  11/1/2010        Heartburn ICD-10-CM: R12  ICD-9-CM: 787.1  11/1/2010        SOB (shortness of breath) on exertion ICD-10-CM: R06.02  ICD-9-CM: 786.05  11/1/2010        Hypothyroidism ICD-10-CM: E03.9  ICD-9-CM: 244.9  11/1/2010        Low back pain ICD-10-CM: M54.5  ICD-9-CM: 724.2  11/1/2010

## 2018-07-05 LAB — GTT, 1 HR, GLUCOLA, EXTERNAL: NORMAL

## 2018-07-24 ENCOUNTER — ROUTINE PRENATAL (OUTPATIENT)
Dept: OBGYN CLINIC | Age: 38
End: 2018-07-24

## 2018-07-24 VITALS — SYSTOLIC BLOOD PRESSURE: 94 MMHG | DIASTOLIC BLOOD PRESSURE: 62 MMHG | BODY MASS INDEX: 30.45 KG/M2 | WEIGHT: 183 LBS

## 2018-07-24 DIAGNOSIS — Z23 ENCOUNTER FOR IMMUNIZATION: ICD-10-CM

## 2018-07-24 DIAGNOSIS — O09.523 SUPERVISION OF ELDERLY MULTIGRAVIDA IN THIRD TRIMESTER: Primary | ICD-10-CM

## 2018-07-24 DIAGNOSIS — Z3A.30 30 WEEKS GESTATION OF PREGNANCY: ICD-10-CM

## 2018-07-24 LAB
HCT, EXTERNAL: 31.3
HGB, EXTERNAL: 10.3
PLATELET CNT,   EXTERNAL: 218

## 2018-07-24 NOTE — PROGRESS NOTES
Problem List  Date Reviewed: 6/12/2018          Codes Class Noted    Supervision of elderly multigravida in second trimester ICD-10-CM: O09.522  ICD-9-CM: V23.82  3/20/2018    Overview Addendum 5/15/2018 11:42 AM by Kat Lord MD     Intrauterine pregnancy with the following problems identified:  [facility] 9/29/2018 by D=US  Hypothyroid - endocrine, hx of graves disease  UTI - E. 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 eval neg  Inheritest- normal  Panorama low risk-female 3/16/18; MSAFP neg                 Dizziness ICD-10-CM: R42  ICD-9-CM: 780.4  8/6/2015        Fatigue ICD-10-CM: R53.83  ICD-9-CM: 780.79  8/6/2015        Snoring ICD-10-CM: R06.83  ICD-9-CM: 786.09  7/8/2013        Pregnancy ICD-10-CM: Z34.90  ICD-9-CM: V22.2  8/27/2011        Active labor ICD-10-CM: [id number]  ICD-9-CM: Deepak Moellers  8/27/2011        Reflux ICD-10-CM: K21.9  ICD-9-CM: 530.81  Unknown        Constipation ICD-10-CM: 564.0  ICD-9-CM: 564.0  Unknown        Sinusitis ICD-10-CM: J32.9  ICD-9-CM: 473.9  Unknown        Morbid obesity ([address] ICD-10-CM: E66.01  ICD-9-CM: 278.01  Unknown        Polycystic ovarian syndrome ICD-10-CM: E28.2  ICD-9-CM: 256.4  11/1/2010        Infertility female ICD-9-CM: 628.9  11/1/2010        Urinary incontinence ICD-10-CM: R32  ICD-9-CM: 788.30  11/1/2010        Heartburn ICD-10-CM: R12  ICD-9-CM: 787.1  11/1/2010        SOB (shortness of breath) on exertion ICD-10-CM: R06.02  ICD-9-CM: 786.05  11/1/2010        Hypothyroidism ICD-10-CM: E03.9  ICD-9-CM: 244.9  11/1/2010        Low back pain ICD-10-CM: M54.5  ICD-9-CM: 724.2  11/1/2010            Administered TDAP vaccine per MD order. Patient consent signed. Injection given IM in left deltoid. Patient tolerated well, no complications, no side effects.

## 2018-07-24 NOTE — PATIENT INSTRUCTIONS
Weeks 30 to 32 of Your Pregnancy: Care Instructions  Your Care Instructions    You have made it to the final months of your pregnancy. By now, your baby is really starting to look like a baby, with hair and plump skin. As you enter the final weeks of pregnancy, the reality of having a baby may start to set in. This is the time to settle on a name, get your household in order, set up a safe nursery, and find quality  if needed. Doing these things in advance will allow you to focus on caring for and enjoying your new baby. You may also want to have a tour of your hospital's labor and delivery unit to get a better idea of what to expect while you are in the hospital.  During these last months, it is very important to take good care of yourself and pay attention to what your body needs. If your doctor says it is okay for you to work, don't push yourself too hard. Use the tips provided in this care sheet to ease heartburn and care for varicose veins. If you haven't already had the Tdap shot during this pregnancy, talk to your doctor about getting it. It will help protect your  against pertussis infection. Follow-up care is a key part of your treatment and safety. Be sure to make and go to all appointments, and call your doctor if you are having problems. It's also a good idea to know your test results and keep a list of the medicines you take. How can you care for yourself at home? Pay attention to your baby's movements  · You should feel your baby move several times every day. · Your baby now turns less, and kicks and jabs more. · Your baby sleeps 20 to 45 minutes at a time and is more active at certain times of day. · If your doctor wants you to count your baby's kicks:  ¨ Empty your bladder, and lie on your side or relax in a comfortable chair. ¨ Write down your start time. ¨ Pay attention only to your baby's movements. Count any movement except hiccups.   ¨ After you have counted 10 movements, write down your stop time. ¨ Write down how many minutes it took for your baby to move 10 times. ¨ If an hour goes by and you have not recorded 10 movements, have something to eat or drink and then count for another hour. If you do not record 10 movements in either hour, call your doctor. Ease heartburn  · Eat small, frequent meals. · Do not eat chocolate, peppermint, or very spicy foods. Avoid drinks with caffeine, such as coffee, tea, and sodas. · Avoid bending over or lying down after meals. · Talk a short walk after you eat. · If heartburn is a problem at night, do not eat for 2 hours before bedtime. · Take antacids like Mylanta, Maalox, Rolaids, or Tums. Do not take antacids that have sodium bicarbonate. Care for varicose veins  · Varicose veins are blood vessels that stretch out with the extra blood during pregnancy. Your legs may ache or throb. Most varicose veins will go away after the birth. · Avoid standing for long periods of time. Sit with your legs crossed at the ankles, not the knees. · Sit with your feet propped up. · Avoid tight clothing or stockings. Wear support hose. · Exercise regularly. Try walking for at least 30 minutes a day. Where can you learn more? Go to http://jaylene-lor.info/. Enter R057 in the search box to learn more about \"Weeks 30 to 32 of Your Pregnancy: Care Instructions. \"  Current as of: November 21, 2017  Content Version: 11.7  © 8233-1868 [employer]. Care instructions adapted under license by [employer] (which disclaims liability or warranty for this information). If you have questions about a medical condition or this instruction, always ask your healthcare professional. [place] [address] any warranty or liability for your use of this information.

## 2018-07-25 LAB
ERYTHROCYTE [DISTWIDTH] IN BLOOD BY AUTOMATED COUNT: 13.1 % (ref 12.3–15.4)
GLUCOSE 1H P 50 G GLC PO SERPL-MCNC: 112 MG/DL (ref 65–139)
HCT VFR BLD AUTO: 31.3 % (ref 34–46.6)
HGB BLD-MCNC: 10.3 G/DL (ref 11.1–15.9)
MCH RBC QN AUTO: 26.3 PG (ref 26.6–33)
MCHC RBC AUTO-ENTMCNC: 32.9 G/DL (ref 31.5–35.7)
MCV RBC AUTO: 80 FL (ref 79–97)
PLATELET # BLD AUTO: 218 X10E3/UL (ref 150–379)
RBC # BLD AUTO: 3.92 X10E6/UL (ref 3.77–5.28)
WBC # BLD AUTO: 7.7 X10E3/UL (ref 3.4–10.8)

## 2018-07-26 DIAGNOSIS — O09.522 SUPERVISION OF ELDERLY MULTIGRAVIDA IN SECOND TRIMESTER: ICD-10-CM

## 2018-07-26 NOTE — PROGRESS NOTES
Called and notified patient of glucola and h/h results. Advised patient to get an otc iron supplement and to take it at a different time of day than her pnv and to take it with either OJ or water. Pt states understanding and is concerned because she had to do iron infusion in Dec.  Per Dr. Ashly Gregorio continue with this plan right now.

## 2018-08-07 ENCOUNTER — ROUTINE PRENATAL (OUTPATIENT)
Dept: OBGYN CLINIC | Age: 38
End: 2018-08-07

## 2018-08-07 VITALS — DIASTOLIC BLOOD PRESSURE: 60 MMHG | SYSTOLIC BLOOD PRESSURE: 102 MMHG | BODY MASS INDEX: 30.45 KG/M2 | WEIGHT: 183 LBS

## 2018-08-07 DIAGNOSIS — O09.523 SUPERVISION OF ELDERLY MULTIGRAVIDA IN THIRD TRIMESTER: Primary | ICD-10-CM

## 2018-08-07 DIAGNOSIS — D64.9 ANEMIA, UNSPECIFIED TYPE: ICD-10-CM

## 2018-08-07 DIAGNOSIS — Z3A.32 32 WEEKS GESTATION OF PREGNANCY: ICD-10-CM

## 2018-08-07 NOTE — PROGRESS NOTES
Problem List  Date Reviewed: 6/12/2018          Codes Class Noted    Supervision of elderly multigravida in second trimester ICD-10-CM: O09.522  ICD-9-CM: V23.82  3/20/2018    Overview Addendum 7/26/2018  2:26 PM by April Gosia     Intrauterine pregnancy with the following problems identified:  [facility] 9/29/2018 by D=US  Hypothyroid - endocrine, hx of graves disease  UTI - E. 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  eval neg  Inheritest- normal  Panorama low risk-female 3/16/18; MSAFP neg                 Dizziness ICD-10-CM: R42  ICD-9-CM: 780.4  8/6/2015        Fatigue ICD-10-CM: R53.83  ICD-9-CM: 780.79  8/6/2015        Snoring ICD-10-CM: R06.83  ICD-9-CM: 786.09  7/8/2013        Pregnancy ICD-10-CM: Z34.90  ICD-9-CM: V22.2  8/27/2011        Active labor ICD-10-CM: [id number]  ICD-9-CM: Union Reece  8/27/2011        Reflux ICD-10-CM: K21.9  ICD-9-CM: 530.81  Unknown        Constipation ICD-10-CM: 564.0  ICD-9-CM: 564.0  Unknown        Sinusitis ICD-10-CM: J32.9  ICD-9-CM: 473.9  Unknown        Morbid obesity ([address] ICD-10-CM: E66.01  ICD-9-CM: 278.01  Unknown        Polycystic ovarian syndrome ICD-10-CM: E28.2  ICD-9-CM: 256.4  11/1/2010        Infertility female ICD-9-CM: 628.9  11/1/2010        Urinary incontinence ICD-10-CM: R32  ICD-9-CM: 788.30  11/1/2010        Heartburn ICD-10-CM: R12  ICD-9-CM: 787.1  11/1/2010        SOB (shortness of breath) on exertion ICD-10-CM: R06.02  ICD-9-CM: 786.05  11/1/2010        Hypothyroidism ICD-10-CM: E03.9  ICD-9-CM: 244.9  11/1/2010        Low back pain ICD-10-CM: M54.5  ICD-9-CM: 724.2  11/1/2010

## 2018-08-07 NOTE — PATIENT INSTRUCTIONS
Weeks 32 to 34 of Your Pregnancy: Care Instructions  Your Care Instructions    During the last few weeks of your pregnancy, you may have more aches and pains. It's important to rest when you can. Your growing baby is putting more pressure on your bladder. So you may need to urinate more often. Hemorrhoids are also common. These are painful, itchy veins in the rectal area. In the 36th week, most women have a test for group B streptococcus (GBS). GBS is a common bacteria that can live in the vagina and rectum. It can make your baby sick after birth. If you test positive, you will get antibiotics during labor. These will keep your baby from getting the bacteria. You may want to talk with your doctor about banking your baby's umbilical cord blood. This is the blood left in the cord after birth. If you want to save this blood, you must arrange it ahead of time. You can't decide at the last minute. If you haven't already had the Tdap shot during this pregnancy, talk to your doctor about getting it. It will help protect your  against pertussis infection. Follow-up care is a key part of your treatment and safety. Be sure to make and go to all appointments, and call your doctor if you are having problems. It's also a good idea to know your test results and keep a list of the medicines you take. How can you care for yourself at home? Ease hemorrhoids  · Get more liquids, fruits, vegetables, and fiber in your diet. This will help keep your stools soft. · Avoid sitting for too long. Lie on your left side several times a day. · Clean yourself with soft, moist toilet paper. Or you can use witch hazel pads or personal hygiene pads. · If you are uncomfortable, try ice packs. Or you can sit in a warm sitz bath. Do these for 20 minutes at a time, as needed. · Use hydrocortisone cream for pain and itching. Two examples are Anusol and Preparation H Hydrocortisone.   · Ask your doctor about taking an over-the-counter stool softener. Consider breastfeeding  · Experts recommend that women breastfeed for 1 year or longer. Breast milk is the perfect food for babies. · Breast milk is easier for babies to digest than formula. And it is always available, just the right temperature, and free. · Breast milk may help protect your child from some health problems.  babies are less likely than formula-fed babies to:  ¨ Get ear infections, colds, diarrhea, and pneumonia. ¨ Be obese or get diabetes later in life. · Women who breastfeed have less bleeding after the birth. Their uteruses also shrink back faster. · Some women who breastfeed lose weight faster. Making milk burns calories. · Breastfeeding can lower your risk of breast cancer, ovarian cancer, and osteoporosis. Decide about circumcision for boys  · As you make this decision, it may help to think about your personal, [employer], and family traditions. You get to decide if you will keep your son's penis natural or if he will be circumcised. · If you decide that you would like to have your baby circumcised, talk with your doctor. You can share your concerns about pain. And you can discuss your preferences for anesthesia. Where can you learn more? Go to http://jaylene-lor.info/. Enter L315 in the search box to learn more about \"Weeks 32 to 34 of Your Pregnancy: Care Instructions. \"  Current as of: November 21, 2017  Content Version: 11.7  © 1054-8065 [employer]. Care instructions adapted under license by [employer] (which disclaims liability or warranty for this information). If you have questions about a medical condition or this instruction, always ask your healthcare professional. [place] [address] any warranty or liability for your use of this information.

## 2018-08-08 ENCOUNTER — TELEPHONE (OUTPATIENT)
Dept: OBGYN CLINIC | Age: 38
End: 2018-08-08

## 2018-08-08 ENCOUNTER — PATIENT MESSAGE (OUTPATIENT)
Dept: OBGYN CLINIC | Age: 38
End: 2018-08-08

## 2018-08-08 NOTE — TELEPHONE ENCOUNTER
Patient calling stating that she has a hematologist that she has an appt with and needs her records. Patient advised that she would need to sign a records release for these records. Patient will come by our office to sign this release for her records of lab results.

## 2018-08-14 NOTE — TELEPHONE ENCOUNTER
Called and notified patient of hematologist appt with Dr. Armida Jaffe. Pt states she would rather see this doctor than the one she went to before. She is scheduled for 8/24/18 at 2pm.  Pt states understanding.

## 2018-08-22 ENCOUNTER — ROUTINE PRENATAL (OUTPATIENT)
Dept: OBGYN CLINIC | Age: 38
End: 2018-08-22

## 2018-08-22 VITALS — SYSTOLIC BLOOD PRESSURE: 102 MMHG | WEIGHT: 187 LBS | BODY MASS INDEX: 31.12 KG/M2 | DIASTOLIC BLOOD PRESSURE: 60 MMHG

## 2018-08-22 DIAGNOSIS — O09.523 SUPERVISION OF ELDERLY MULTIGRAVIDA IN THIRD TRIMESTER: Primary | ICD-10-CM

## 2018-08-22 DIAGNOSIS — Z3A.34 34 WEEKS GESTATION OF PREGNANCY: ICD-10-CM

## 2018-08-22 RX ORDER — VALACYCLOVIR HYDROCHLORIDE 500 MG/1
500 TABLET, FILM COATED ORAL 2 TIMES DAILY
Qty: 60 TAB | Refills: 1 | Status: SHIPPED | OUTPATIENT
Start: 2018-08-22 | End: 2018-09-26

## 2018-08-22 NOTE — PROGRESS NOTES
LIMITED OB SCAN  A SINGLE VERTEX 34W4D IUP IS SEEN. FETAL CARDIAC MOTION OBSERVED. LIMITED ANATOMY WAS VISUALIZED AND APPEARS WNL. APPROPRIATE FETAL GROWTH IS SEEN. SIZE = DATES. JAMES AND PLACENTA APPEAR WNL. Problem List  Date Reviewed: 8/7/2018          Codes Class Noted    Supervision of elderly multigravida in second trimester ICD-10-CM: O09.522  ICD-9-CM: V23.82  3/20/2018    Overview Addendum 7/26/2018  2:26 PM by April Hoggood     Intrauterine pregnancy with the following problems identified:  [facility] 9/29/2018 by D=US  Hypothyroid - endocrine, hx of graves disease  UTI - E. 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  eval neg  Inheritest- normal  Panorama low risk-female 3/16/18; MSAFP neg                 Dizziness ICD-10-CM: R42  ICD-9-CM: 780.4  8/6/2015        Fatigue ICD-10-CM: R53.83  ICD-9-CM: 780.79  8/6/2015        Snoring ICD-10-CM: R06.83  ICD-9-CM: 786.09  7/8/2013        Pregnancy ICD-10-CM: Z34.90  ICD-9-CM: V22.2  8/27/2011        Active labor ICD-10-CM: [id number]  ICD-9-CM: Shelbie Click  8/27/2011        Reflux ICD-10-CM: K21.9  ICD-9-CM: 530.81  Unknown        Constipation ICD-10-CM: 564.0  ICD-9-CM: 564.0  Unknown        Sinusitis ICD-10-CM: J32.9  ICD-9-CM: 473.9  Unknown        Morbid obesity ([address] ICD-10-CM: E66.01  ICD-9-CM: 278.01  Unknown        Polycystic ovarian syndrome ICD-10-CM: E28.2  ICD-9-CM: 256.4  11/1/2010        Infertility female ICD-9-CM: 628.9  11/1/2010        Urinary incontinence ICD-10-CM: R32  ICD-9-CM: 788.30  11/1/2010        Heartburn ICD-10-CM: R12  ICD-9-CM: 787.1  11/1/2010        SOB (shortness of breath) on exertion ICD-10-CM: R06.02  ICD-9-CM: 786.05  11/1/2010        Hypothyroidism ICD-10-CM: E03.9  ICD-9-CM: 244.9  11/1/2010        Low back pain ICD-10-CM: M54.5  ICD-9-CM: 724.2 11/1/2010

## 2018-08-24 ENCOUNTER — OFFICE VISIT (OUTPATIENT)
Dept: ONCOLOGY | Age: 38
End: 2018-08-24

## 2018-08-24 VITALS
BODY MASS INDEX: 30.75 KG/M2 | WEIGHT: 184.6 LBS | DIASTOLIC BLOOD PRESSURE: 70 MMHG | HEIGHT: 65 IN | TEMPERATURE: 97.3 F | OXYGEN SATURATION: 100 % | HEART RATE: 75 BPM | SYSTOLIC BLOOD PRESSURE: 107 MMHG

## 2018-08-24 DIAGNOSIS — D50.8 IRON DEFICIENCY ANEMIA SECONDARY TO INADEQUATE DIETARY IRON INTAKE: Primary | ICD-10-CM

## 2018-08-24 DIAGNOSIS — Z3A.34 34 WEEKS GESTATION OF PREGNANCY: ICD-10-CM

## 2018-08-24 NOTE — MR AVS SNAPSHOT
[facility] 
 
 
 [address] [facility], [address] 
[phone] Patient: Kanchan Murguia MRN: [id number] Visit Information Date & Time Provider Department Dept. Phone Encounter #  
 8/24/2018  2:00 PM MD Chandu Sutton Oncology at [address] [place] [address] [phone] Follow-up Instructions Return in about 5 months (around 1/24/2019) for Anemia f/u, Thorn. Your Appointments 1/10/2019 10:30 AM  
ESTABLISHED PATIENT with MD Chandu Sutton Oncology at [address] [place] [address] [facility]) Appt Note: 5 mo fu, Howien [address] [facility], [address] [place] [address]  
[phone]  
  
   
 [address] [place] [address], [address] [place] [address]  
  
    
  
 9/4/2018 11:20 AM  
OB VISIT with Shalini Vazquez MD  
[facility] Appt Note: 36wks GBS today AH; 36 wks-gbs today [id number] [place] [address] 31 [address]33 [address] Upcoming Health Maintenance Date Due Influenza Age 5 to Adult 8/1/2018 PAP AKA CERVICAL CYTOLOGY 3/16/2021 DTaP/Tdap/Td series (2 - Td) 7/24/2028 Allergies as of 8/24/2018  Review Complete On: 8/24/2018 By: Adwoa Coleman RN No Known Allergies Current Immunizations  Reviewed on 10/3/2013 Name Date Tdap 7/24/2018 Not reviewed this visit You Were Diagnosed With   
  
 Codes Comments Iron deficiency anemia secondary to inadequate dietary iron intake    -  Primary ICD-10-CM: D50.8 ICD-9-CM: 280.1 Vitals BP Pulse Temp Height(growth percentile) Weight(growth percentile) 107/70 (BP 1 Location: Left arm, BP Patient Position: Sitting) 75 97.3 °F (36.3 °C) (Oral) 5' 5\" (1.651 m) 184 lb 9.6 oz (83.7 kg) LMP SpO2 BMI OB Status Smoking Status 12/23/2017 (Exact Date) 100% 30.72 kg/m2 Pregnant Never Smoker Vitals History BMI and BSA Data Body Mass Index Body Surface Area 30.72 kg/m 2 1.96 m 2 Preferred Pharmacy Pharmacy Name Phone [facility] [place], [address] [place] AT [facility] [phone] Your Updated Medication List  
  
   
This list is accurate as of 8/24/18  2:53 PM.  Always use your most recent med list.  
  
  
  
  
 LEVOXYL 112 mcg tablet Generic drug:  levothyroxine Take 125 mcg by mouth Daily (before breakfast). PRENATAL PO Take  by mouth. valACYclovir 500 mg tablet Commonly known as:  VALTREX Take 1 Tab by mouth two (2) times a day. We Performed the Following CBC WITH AUTOMATED DIFF [88421 CPT(R)] FERRITIN [51063 CPT(R)] IRON PROFILE [id number] CPT(R)] Follow-up Instructions Return in about 5 months (around 1/24/2019) for Anemia f/u, Thorn. To-Do List   
 09/18/2018 Lab:  CBC WITH AUTOMATED DIFF   
  
 09/18/2018 Lab:  FERRITIN   
  
 09/18/2018 Lab:  IRON PROFILE Introducing [facility]! Dear Tom Stevens: 
Thank you for requesting a [employer] account. Our records indicate that you already have an active [employer] account. You can access your account anytime at [email or web address] Did you know that you can access your hospital and ER discharge instructions at any time in [employer]? You can also review all of your test results from your hospital stay or ER visit. Additional Information If you have questions, please visit the Frequently Asked Questions section of the [employer] website at [email or web address] Remember, [employer] is NOT to be used for urgent needs. For medical emergencies, dial 911. Now available from your iPhone and Android! Please provide this summary of care documentation to your next provider. Your primary care clinician is listed as NONE. If you have any questions after today's visit, please call [phone].

## 2018-08-24 NOTE — PROGRESS NOTES
[address] [facility] at [facility]  [phone]    Hematology / Oncology Consult    Reason for Visit:   Geri Hathaway is a 42 y.o. female who is seen in consultation at the request of Dr. Yenny Boo for evaluation of anemia. History of Present Illness:   Ms. Squire Phoenix is a 43 y/o female who is pregnant and referred for evaluation and management of anemia. She is 34 weeks pregnant, due . She denies heavy menstrual periods. However, she reports gastric sleeve surgery in approx . Afterwards, she has remained intermittently anemic. She saw [facility] in the past and underwent iron infusion in 2017. She denies hematuria, hematochezia/melena. She is taking prenatal vitamins with iron. She endorses h/o ice cravings in the past and currently. No h/o blood transfusions. For hypothyroidism, she takes LT4 125mcg/d. She does get occasional constipation. No significant shortness of breath, HUA. Past Medical History:   Diagnosis Date    Abnormal Pap smear of cervix 2016    Neg pap +HPV     history     Acquired hypothyroidism     hyperthyroid, graves disease    Anxiety     recent diagnosis    Constipation     Frequent urination     GERD (gastroesophageal reflux disease)     HSV-2 infection 2014    HX OTHER MEDICAL     compression    Infertility     clomid    Leg pain     Morbid obesity ([address]     PCOS (polycystic ovarian syndrome)     Rapid pulse     Reflux     Sinusitis     Snoring     Thyroid disease     Varicose vein       Past Surgical History:   Procedure Laterality Date    HX GASTRIC BYPASS  10/2/13    HX HEENT  orbital surgery    orbital decompression.        Social History   Substance Use Topics    Smoking status: Never Smoker    Smokeless tobacco: Never Used    Alcohol use No      Family History   Problem Relation Age of Onset    Hypertension Mother     Diabetes Mother     High Cholesterol Mother     Hypertension Father     Diabetes Father  Heart Disease Father     Stroke Father     Hypertension Maternal Grandmother     Diabetes Maternal Grandmother     Hypertension Maternal Grandfather     Diabetes Maternal Grandfather     Stroke Maternal Grandfather     Hypertension Paternal Grandmother     Diabetes Paternal Grandmother     Stroke Paternal Grandmother     Hypertension Paternal Grandfather     Diabetes Paternal Grandfather      Current Outpatient Prescriptions   Medication Sig    PNV OJ.36/KSHTCWK fum/folic ac (PRENATAL PO) Take  by mouth.  levothyroxine (LEVOXYL) 112 mcg tablet Take 125 mcg by mouth Daily (before breakfast).  valACYclovir (VALTREX) 500 mg tablet Take 1 Tab by mouth two (2) times a day. No current facility-administered medications for this visit. No Known Allergies     Review of Systems: A complete review of systems was obtained, negative except as described above. Physical Exam:     Visit Vitals    /70 (BP 1 Location: Left arm, BP Patient Position: Sitting)    Pulse 75    Temp 97.3 °F (36.3 °C) (Oral)    Ht 5' 5\" (1.651 m)    Wt 184 lb 9.6 oz (83.7 kg)    LMP 12/23/2017 (Exact Date)    SpO2 100%    BMI 30.72 kg/m2     ECOG PS: 0  General: Well developed, no acute distress  Eyes: PERRLA, EOMI, anicteric sclerae  HENT: Atraumatic, OP clear, TMs intact without erythema  Neck: Supple  Lymphatic: No cervical, supraclavicular, axillary or inguinal adenopathy  Respiratory: CTAB, normal respiratory effort  CV: Normal rate, regular rhythm, no murmurs, no peripheral edema  GI / : Soft, nontender, nondistended, no masses. Cannot appreciate HSM due to gravid uterus. MS: Normal gait and station. Digits without clubbing or cyanosis. Skin: No rashes, ecchymoses, or petechiae. Normal temperature, turgor, and texture. Neuro/Psych: Alert, oriented. 5/5 strength in all 4 extremities. Appropriate affect, normal judgment/insight.     Results:     Lab Results   Component Value Date/Time    WBC 7.7 07/24/2018 01:59 PM    HGB 10.3 (L) 07/24/2018 01:59 PM    HCT 31.3 (L) 07/24/2018 01:59 PM    PLATELET 758 07/39/8747 01:59 PM    MCV 80 07/24/2018 01:59 PM    ABS. NEUTROPHILS 9.2 (H) 10/03/2013 03:25 AM    Hgb, External 10.3 07/24/2018    Hct, External 31.3 07/24/2018    Platelet cnt., External 218 07/24/2018     Lab Results   Component Value Date/Time    Sodium 138 08/07/2015 11:43 AM    Potassium 4.2 08/07/2015 11:43 AM    Chloride 100 08/07/2015 11:43 AM    CO2 23 08/07/2015 11:43 AM    Glucose 84 08/07/2015 11:43 AM    BUN 7 08/07/2015 11:43 AM    Creatinine 1.02 (H) 08/07/2015 11:43 AM    GFR est AA 82 08/07/2015 11:43 AM    GFR est non-AA 71 08/07/2015 11:43 AM    Calcium 9.4 08/07/2015 11:43 AM    Glucose (POC) 123 (H) 10/02/2013 11:06 PM     Lab Results   Component Value Date/Time    Bilirubin, total 0.3 08/07/2015 11:43 AM    ALT (SGPT) 10 08/07/2015 11:43 AM    AST (SGOT) 17 08/07/2015 11:43 AM    Alk. phosphatase 36 (L) 08/07/2015 11:43 AM    Protein, total 6.9 08/07/2015 11:43 AM    Albumin 4.4 08/07/2015 11:43 AM    Globulin 3.6 10/03/2013 03:25 AM     Lab Results   Component Value Date/Time    Iron 25 (L) 08/07/2015 11:43 AM    TIBC 429 08/07/2015 11:43 AM    Iron % saturation 6 (LL) 08/07/2015 11:43 AM           Imaging:     Radiology report(s) reviewed. .      Assessment & Plan:   Ortega Sofia is a 42 y.o. female comes in for evaluation and management of iron deficiency. 1. Normocytic anemia: Likely iron deficiency anemia 2/2 gastric sleeve surgery and pregnancy. Pt would benefit from parenteral iron. She feels she responded well in the past. I recommend iron sucrose during pregnancy. -- Check iron profile, ferritin. -- If clearly iron deficient, will treat with iron sucrose 200mg IV over 15 min q48h x 5 infusions. -- Return in 4-5 months    2. Pregnancy: 34 weeks, due on 9/29/18. Pt follows with Dr. Lawyer Moore and sees her again 9/4/18.     3. H/o gastric sleeve: Pt may become iron deficient in the future and encouraged her to keep up with regular monitoring either with PCP or with our office. I appreciate the opportunity to participate in [facility].     Signed By: Phylliss Spurling, MD     August 24, 2018

## 2018-08-25 LAB
BASOPHILS # BLD AUTO: 0.1 X10E3/UL (ref 0–0.2)
BASOPHILS NFR BLD AUTO: 1 %
EOSINOPHIL # BLD AUTO: 0.3 X10E3/UL (ref 0–0.4)
EOSINOPHIL NFR BLD AUTO: 4 %
ERYTHROCYTE [DISTWIDTH] IN BLOOD BY AUTOMATED COUNT: 14.5 % (ref 12.3–15.4)
FERRITIN SERPL-MCNC: 6 NG/ML (ref 15–150)
HCT VFR BLD AUTO: 32 % (ref 34–46.6)
HGB BLD-MCNC: 10.5 G/DL (ref 11.1–15.9)
IMM GRANULOCYTES # BLD: 0.4 X10E3/UL (ref 0–0.1)
IMM GRANULOCYTES NFR BLD: 4 %
IRON SATN MFR SERPL: 5 % (ref 15–55)
IRON SERPL-MCNC: 29 UG/DL (ref 27–159)
LYMPHOCYTES # BLD AUTO: 2.2 X10E3/UL (ref 0.7–3.1)
LYMPHOCYTES NFR BLD AUTO: 27 %
MCH RBC QN AUTO: 24.9 PG (ref 26.6–33)
MCHC RBC AUTO-ENTMCNC: 32.8 G/DL (ref 31.5–35.7)
MCV RBC AUTO: 76 FL (ref 79–97)
MONOCYTES # BLD AUTO: 0.7 X10E3/UL (ref 0.1–0.9)
MONOCYTES NFR BLD AUTO: 8 %
NEUTROPHILS # BLD AUTO: 4.7 X10E3/UL (ref 1.4–7)
NEUTROPHILS NFR BLD AUTO: 56 %
PLATELET # BLD AUTO: 211 X10E3/UL (ref 150–379)
RBC # BLD AUTO: 4.21 X10E6/UL (ref 3.77–5.28)
TIBC SERPL-MCNC: 556 UG/DL (ref 250–450)
UIBC SERPL-MCNC: 527 UG/DL (ref 131–425)
WBC # BLD AUTO: 8.4 X10E3/UL (ref 3.4–10.8)

## 2018-08-27 ENCOUNTER — TELEPHONE (OUTPATIENT)
Dept: ONCOLOGY | Age: 38
End: 2018-08-27

## 2018-08-27 NOTE — TELEPHONE ENCOUNTER
[address]ino at [facility]  [phone]      08/27/18 2:52 PM Spoke with patient, informed her that the [facility] (infusion 's) attempted to call her to schedule her iron infusions. Provided patient with phone number to call them back. Patient states she will call today to arrange appointment dates and time. No further questions or concerns at this time.

## 2018-09-04 ENCOUNTER — ROUTINE PRENATAL (OUTPATIENT)
Dept: OBGYN CLINIC | Age: 38
End: 2018-09-04

## 2018-09-04 VITALS — WEIGHT: 187 LBS | SYSTOLIC BLOOD PRESSURE: 124 MMHG | BODY MASS INDEX: 31.12 KG/M2 | DIASTOLIC BLOOD PRESSURE: 68 MMHG

## 2018-09-04 DIAGNOSIS — Z3A.36 36 WEEKS GESTATION OF PREGNANCY: ICD-10-CM

## 2018-09-04 DIAGNOSIS — O09.523 SUPERVISION OF ELDERLY MULTIGRAVIDA IN THIRD TRIMESTER: Primary | ICD-10-CM

## 2018-09-04 RX ORDER — SODIUM CHLORIDE 9 MG/ML
25 INJECTION, SOLUTION INTRAVENOUS ONCE
Status: DISPENSED | OUTPATIENT
Start: 2018-09-10 | End: 2018-09-10

## 2018-09-04 NOTE — PROGRESS NOTES
Problem List  Date Reviewed: 8/24/2018          Codes Class Noted    Supervision of elderly multigravida in second trimester ICD-10-CM: O09.522  ICD-9-CM: V23.82  3/20/2018    Overview Addendum 7/26/2018  2:26 PM by April Gosia     Intrauterine pregnancy with the following problems identified:  [facility] 9/29/2018 by D=US  Hypothyroid - endocrine, hx of graves disease  UTI - E. 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  eval neg  Inheritest- normal  Panorama low risk-female 3/16/18; MSAFP neg                 Dizziness ICD-10-CM: R42  ICD-9-CM: 780.4  8/6/2015        Fatigue ICD-10-CM: R53.83  ICD-9-CM: 780.79  8/6/2015        Snoring ICD-10-CM: R06.83  ICD-9-CM: 786.09  7/8/2013        Pregnancy ICD-10-CM: Z34.90  ICD-9-CM: V22.2  8/27/2011        Active labor ICD-10-CM: [id number]  ICD-9-CM: Katherine Felt  8/27/2011        Reflux ICD-10-CM: K21.9  ICD-9-CM: 530.81  Unknown        Constipation ICD-10-CM: 564.0  ICD-9-CM: 564.0  Unknown        Sinusitis ICD-10-CM: J32.9  ICD-9-CM: 473.9  Unknown        Morbid obesity ([address] ICD-10-CM: E66.01  ICD-9-CM: 278.01  Unknown        Polycystic ovarian syndrome ICD-10-CM: E28.2  ICD-9-CM: 256.4  11/1/2010        Infertility female ICD-9-CM: 628.9  11/1/2010        Urinary incontinence ICD-10-CM: R32  ICD-9-CM: 788.30  11/1/2010        Heartburn ICD-10-CM: R12  ICD-9-CM: 787.1  11/1/2010        SOB (shortness of breath) on exertion ICD-10-CM: R06.02  ICD-9-CM: 786.05  11/1/2010        Hypothyroidism ICD-10-CM: E03.9  ICD-9-CM: 244.9  11/1/2010        Low back pain ICD-10-CM: M54.5  ICD-9-CM: 724.2  11/1/2010

## 2018-09-05 LAB — GRBS, EXTERNAL: NEGATIVE

## 2018-09-08 LAB — B-HEM STREP SPEC QL CULT: NEGATIVE

## 2018-09-10 ENCOUNTER — HOSPITAL ENCOUNTER (OUTPATIENT)
Dept: INFUSION THERAPY | Age: 38
Discharge: HOME | End: 2018-09-10
Payer: COMMERCIAL

## 2018-09-10 VITALS
SYSTOLIC BLOOD PRESSURE: 108 MMHG | TEMPERATURE: 98.2 F | RESPIRATION RATE: 18 BRPM | DIASTOLIC BLOOD PRESSURE: 64 MMHG | HEART RATE: 71 BPM

## 2018-09-10 DIAGNOSIS — O09.522 SUPERVISION OF ELDERLY MULTIGRAVIDA IN SECOND TRIMESTER: ICD-10-CM

## 2018-09-10 PROCEDURE — 96374 THER/PROPH/DIAG INJ IV PUSH: CPT

## 2018-09-10 PROCEDURE — 74011000258 HC RX REV CODE- 258: Performed by: NURSE PRACTITIONER

## 2018-09-10 PROCEDURE — 96365 THER/PROPH/DIAG IV INF INIT: CPT

## 2018-09-10 PROCEDURE — 74011250636 HC RX REV CODE- 250/636: Performed by: NURSE PRACTITIONER

## 2018-09-10 RX ADMIN — IRON SUCROSE 200 MG: 20 INJECTION, SOLUTION INTRAVENOUS at 11:53

## 2018-09-10 NOTE — PROGRESS NOTES
Outpatient Infusion Center Short Visit Progress Note 1100 Patient admitted to [facility] for Venofer 1/5 ambulatory in stable condition. Assessment completed. No new concerns voiced. Pt 37 weeks gestation. Reports hx of gastric sleeve and has since had low iron. IV #24 to right AC x1 attempt. Pt elected to leave IV intact overnight for infusion tomorrow. IV secured with Opsite and wrapped with Coban. Green cap applied. Pt given Prevantics and extra green caps and instructed how to clean port and reapply cap PRN. Educated pt to keep IV site dry and leave Coban intact until returning for appt. Pt verbalized understanding. Visit Vitals  /64  Pulse 71  Temp 98.2 °F (36.8 °C)  Resp 18  LMP 12/23/2017 (Exact Date)  Breastfeeding No  
 
 
Medications: 
IV Venofer 96 [id number] Patient tolerated treatment well. Patient discharged from [facility] ambulatory in no distress. Patient aware of next appointment scheduled for tomorrow at 1100.

## 2018-09-10 NOTE — PROGRESS NOTES
Problem: Patient Education:  Go to Education Activity Goal: Patient/Family Education Outcome: Progressing Towards Goal 
Educated pt on benefits of iron infusion Educated pt on maintenance of IVL at [employer] nurses note

## 2018-09-11 ENCOUNTER — HOSPITAL ENCOUNTER (OUTPATIENT)
Dept: INFUSION THERAPY | Age: 38
Discharge: HOME | End: 2018-09-11
Payer: COMMERCIAL

## 2018-09-11 VITALS
TEMPERATURE: 98.9 F | SYSTOLIC BLOOD PRESSURE: 93 MMHG | RESPIRATION RATE: 16 BRPM | DIASTOLIC BLOOD PRESSURE: 62 MMHG | HEART RATE: 87 BPM

## 2018-09-11 PROCEDURE — 74011250636 HC RX REV CODE- 250/636: Performed by: NURSE PRACTITIONER

## 2018-09-11 PROCEDURE — 74011000258 HC RX REV CODE- 258: Performed by: NURSE PRACTITIONER

## 2018-09-11 PROCEDURE — 96374 THER/PROPH/DIAG INJ IV PUSH: CPT

## 2018-09-11 RX ADMIN — IRON SUCROSE 200 MG: 20 INJECTION, SOLUTION INTRAVENOUS at 11:49

## 2018-09-11 NOTE — PROGRESS NOTES
[facility] VISIT NOTE 
 
9711. Pt arrived at [address] [place] [address] and in no distress for Venofer 2/5. Assessment completed, pt without complaint. 24g PIV placed prior to admission. Positive blood return noted and flushes easily. Medications received: 
Venofer IV Tolerated treatment well, no adverse reaction noted. Patient requested to leave PIV in for next treatment tomorrow. PIV flushes easily with positive blood return. Wrapped with coban and educated patient precautions at home. Patient verbalized understanding. Patient Vitals for the past 12 hrs: 
 Temp Pulse Resp BP  
09/11/18 1207 98.9 °F (37.2 °C) 87 16 93/62  
09/11/18 1106 97.4 °F (36.3 °C) 87 16 109/64  
 
1210. D/C'd from [address] [place] [address] and in no distress.  Next appointment is 9/12/18 at 11:00 am.

## 2018-09-12 ENCOUNTER — ROUTINE PRENATAL (OUTPATIENT)
Dept: OBGYN CLINIC | Age: 38
End: 2018-09-12

## 2018-09-12 ENCOUNTER — HOSPITAL ENCOUNTER (OUTPATIENT)
Dept: INFUSION THERAPY | Age: 38
Discharge: HOME | End: 2018-09-12
Payer: COMMERCIAL

## 2018-09-12 VITALS — BODY MASS INDEX: 31.35 KG/M2 | SYSTOLIC BLOOD PRESSURE: 102 MMHG | WEIGHT: 188.4 LBS | DIASTOLIC BLOOD PRESSURE: 68 MMHG

## 2018-09-12 VITALS — RESPIRATION RATE: 16 BRPM | HEART RATE: 88 BPM | DIASTOLIC BLOOD PRESSURE: 70 MMHG | SYSTOLIC BLOOD PRESSURE: 118 MMHG

## 2018-09-12 DIAGNOSIS — O09.523 SUPERVISION OF ELDERLY MULTIGRAVIDA IN THIRD TRIMESTER: Primary | ICD-10-CM

## 2018-09-12 DIAGNOSIS — Z23 ENCOUNTER FOR IMMUNIZATION: ICD-10-CM

## 2018-09-12 DIAGNOSIS — Z3A.37 37 WEEKS GESTATION OF PREGNANCY: ICD-10-CM

## 2018-09-12 PROCEDURE — 96374 THER/PROPH/DIAG INJ IV PUSH: CPT

## 2018-09-12 PROCEDURE — 96365 THER/PROPH/DIAG IV INF INIT: CPT

## 2018-09-12 PROCEDURE — 74011000258 HC RX REV CODE- 258: Performed by: NURSE PRACTITIONER

## 2018-09-12 PROCEDURE — 74011250636 HC RX REV CODE- 250/636: Performed by: NURSE PRACTITIONER

## 2018-09-12 RX ADMIN — IRON SUCROSE 200 MG: 20 INJECTION, SOLUTION INTRAVENOUS at 11:21

## 2018-09-12 NOTE — PROGRESS NOTES
Problem List  Date Reviewed: 9/4/2018          Codes Class Noted    Supervision of elderly multigravida in second trimester ICD-10-CM: O09.522  ICD-9-CM: V23.82  3/20/2018    Overview Addendum 7/26/2018  2:26 PM by April Gosia     Intrauterine pregnancy with the following problems identified:  [facility] 9/29/2018 by D=US  Hypothyroid - endocrine, hx of graves disease  UTI - E. 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  eval neg  Inheritest- normal  Panorama low risk-female 3/16/18; MSAFP neg                 Dizziness ICD-10-CM: R42  ICD-9-CM: 780.4  8/6/2015        Fatigue ICD-10-CM: R53.83  ICD-9-CM: 780.79  8/6/2015        Snoring ICD-10-CM: R06.83  ICD-9-CM: 786.09  7/8/2013        Pregnancy ICD-10-CM: Z34.90  ICD-9-CM: V22.2  8/27/2011        Active labor ICD-10-CM: [id number]  ICD-9-CM: Glorietta Lizzetho  8/27/2011        Reflux ICD-10-CM: K21.9  ICD-9-CM: 530.81  Unknown        Constipation ICD-10-CM: 564.0  ICD-9-CM: 564.0  Unknown        Sinusitis ICD-10-CM: J32.9  ICD-9-CM: 473.9  Unknown        Morbid obesity ([address] ICD-10-CM: E66.01  ICD-9-CM: 278.01  Unknown        Polycystic ovarian syndrome ICD-10-CM: E28.2  ICD-9-CM: 256.4  11/1/2010        Infertility female ICD-9-CM: 628.9  11/1/2010        Urinary incontinence ICD-10-CM: R32  ICD-9-CM: 788.30  11/1/2010        Heartburn ICD-10-CM: R12  ICD-9-CM: 787.1  11/1/2010        SOB (shortness of breath) on exertion ICD-10-CM: R06.02  ICD-9-CM: 786.05  11/1/2010        Hypothyroidism ICD-10-CM: E03.9  ICD-9-CM: 244.9  11/1/2010        Low back pain ICD-10-CM: M54.5  ICD-9-CM: 724.2  11/1/2010

## 2018-09-12 NOTE — PROGRESS NOTES
Outpatient Infusion Center Short Visit Progress Note 1030 Patient admitted to [facility] for Venofer 3/5 ambulatory in stable condition. Assessment completed. No new concerns voiced. IV to right AC intact, flushes easily and positive blood return noted. Placed on 9/10 by this RN. Visit Vitals  /70  Pulse 88  Resp 16  
 LMP 12/23/2017 (Exact Date) Medications: 
IV Venofer IV NS 
 
1145 Patient tolerated treatment well. IV to right AC removed prior to DC. Patient discharged from [facility] ambulatory in no distress. Patient aware of next appointment scheduled for tomorrow at 1100.

## 2018-09-13 ENCOUNTER — HOSPITAL ENCOUNTER (OUTPATIENT)
Dept: INFUSION THERAPY | Age: 38
Discharge: HOME | End: 2018-09-13
Payer: COMMERCIAL

## 2018-09-13 VITALS
DIASTOLIC BLOOD PRESSURE: 55 MMHG | SYSTOLIC BLOOD PRESSURE: 102 MMHG | HEART RATE: 75 BPM | TEMPERATURE: 98.2 F | RESPIRATION RATE: 16 BRPM

## 2018-09-13 PROCEDURE — 74011250636 HC RX REV CODE- 250/636: Performed by: NURSE PRACTITIONER

## 2018-09-13 PROCEDURE — 96374 THER/PROPH/DIAG INJ IV PUSH: CPT

## 2018-09-13 PROCEDURE — 74011000258 HC RX REV CODE- 258: Performed by: NURSE PRACTITIONER

## 2018-09-13 RX ORDER — SODIUM CHLORIDE 0.9 % (FLUSH) 0.9 %
10 SYRINGE (ML) INJECTION AS NEEDED
Status: ACTIVE | OUTPATIENT
Start: 2018-09-13 | End: 2018-09-14

## 2018-09-13 RX ADMIN — IRON SUCROSE 200 MG: 20 INJECTION, SOLUTION INTRAVENOUS at 11:55

## 2018-09-13 RX ADMIN — Medication 10 ML: at 12:10

## 2018-09-13 RX ADMIN — Medication 10 ML: at 11:33

## 2018-09-13 NOTE — PROGRESS NOTES
[facility] VISIT NOTE 
 
1120 Pt arrived at [facility] ambulatory and in no distress for Venofer 4 of 5. Assessment completed, pt denies any complaints today. She is 37 weeks pregnant. Blood pressure 105/68, pulse 77, temperature 98.4 °F (36.9 °C), resp. rate 16, last menstrual period 12/23/2017. PIV placed in left AC, flushes easily with positive blood return. Medications received: 
Venofer IV Blood pressure 102/55, pulse 75, temperature 98.2 °F (36.8 °C), resp. rate 16, last menstrual period 12/23/2017. Tolerated treatment well, no adverse reaction noted. PIV flushed and secured for treatment tomorrow. [address] D/C'd from [facility] ambulatory and in no distress. Next appointment is 9/14/18 at 1130.

## 2018-09-14 ENCOUNTER — HOSPITAL ENCOUNTER (OUTPATIENT)
Dept: INFUSION THERAPY | Age: 38
Discharge: HOME | End: 2018-09-14
Payer: COMMERCIAL

## 2018-09-14 VITALS
DIASTOLIC BLOOD PRESSURE: 66 MMHG | RESPIRATION RATE: 18 BRPM | TEMPERATURE: 98.3 F | HEART RATE: 75 BPM | SYSTOLIC BLOOD PRESSURE: 99 MMHG

## 2018-09-14 PROCEDURE — 96374 THER/PROPH/DIAG INJ IV PUSH: CPT

## 2018-09-14 PROCEDURE — 74011250636 HC RX REV CODE- 250/636: Performed by: NURSE PRACTITIONER

## 2018-09-14 PROCEDURE — 74011000258 HC RX REV CODE- 258: Performed by: NURSE PRACTITIONER

## 2018-09-14 RX ADMIN — IRON SUCROSE 200 MG: 20 INJECTION, SOLUTION INTRAVENOUS at 11:34

## 2018-09-14 NOTE — PROGRESS NOTES
Administered flu shot per MD order. Patient consent signed. Injection given IM in right deltoid. Patient tolerated well, no complications, no side effects.

## 2018-09-14 NOTE — PROGRESS NOTES
[facility] Progress Note 1105 Pt admit to [facility] for Venofer 5 of 5 ambulatory in stable condition. Assessment completed. No new concerns voiced. PIV started in right arm with positive blood return. Medication ordered. Visit Vitals  BP 99/66  Pulse 75  Temp 98.3 °F (36.8 °C)  Resp 18  LMP 12/23/2017 (Exact Date) Medications: 
Venofer 
 
1200 Pt tolerated treatment well. Pt refused 30 minute monitoring period. PIV removed prior to discharge. D/c home ambulatory in no distress.  Pt to follow up with MD.

## 2018-09-18 DIAGNOSIS — D50.8 IRON DEFICIENCY ANEMIA SECONDARY TO INADEQUATE DIETARY IRON INTAKE: ICD-10-CM

## 2018-09-19 ENCOUNTER — ROUTINE PRENATAL (OUTPATIENT)
Dept: OBGYN CLINIC | Age: 38
End: 2018-09-19

## 2018-09-19 VITALS
HEIGHT: 65 IN | WEIGHT: 184 LBS | DIASTOLIC BLOOD PRESSURE: 62 MMHG | SYSTOLIC BLOOD PRESSURE: 120 MMHG | BODY MASS INDEX: 30.66 KG/M2

## 2018-09-19 DIAGNOSIS — O09.523 SUPERVISION OF ELDERLY MULTIGRAVIDA IN THIRD TRIMESTER: Primary | ICD-10-CM

## 2018-09-19 DIAGNOSIS — Z3A.38 38 WEEKS GESTATION OF PREGNANCY: ICD-10-CM

## 2018-09-24 ENCOUNTER — ANESTHESIA EVENT (OUTPATIENT)
Dept: LABOR AND DELIVERY | Age: 38
End: 2018-09-24
Payer: COMMERCIAL

## 2018-09-24 ENCOUNTER — ANESTHESIA (OUTPATIENT)
Dept: LABOR AND DELIVERY | Age: 38
End: 2018-09-24
Payer: COMMERCIAL

## 2018-09-24 ENCOUNTER — HOSPITAL ENCOUNTER (INPATIENT)
Age: 38
LOS: 2 days | Discharge: HOME | End: 2018-09-26
Attending: OBSTETRICS & GYNECOLOGY | Admitting: OBSTETRICS & GYNECOLOGY
Payer: COMMERCIAL

## 2018-09-24 LAB
BASOPHILS # BLD: 0.1 K/UL (ref 0–0.1)
BASOPHILS NFR BLD: 1 % (ref 0–1)
DIFFERENTIAL METHOD BLD: ABNORMAL
EOSINOPHIL # BLD: 0.4 K/UL (ref 0–0.4)
EOSINOPHIL NFR BLD: 5 % (ref 0–7)
ERYTHROCYTE [DISTWIDTH] IN BLOOD BY AUTOMATED COUNT: 20.8 % (ref 11.5–14.5)
HCT VFR BLD AUTO: 35.8 % (ref 35–47)
HGB BLD-MCNC: 11.1 G/DL (ref 11.5–16)
IMM GRANULOCYTES # BLD: 0 K/UL
IMM GRANULOCYTES NFR BLD AUTO: 0 %
LYMPHOCYTES # BLD: 2.7 K/UL (ref 0.8–3.5)
LYMPHOCYTES NFR BLD: 34 % (ref 12–49)
MCH RBC QN AUTO: 25.2 PG (ref 26–34)
MCHC RBC AUTO-ENTMCNC: 31 G/DL (ref 30–36.5)
MCV RBC AUTO: 81.4 FL (ref 80–99)
METAMYELOCYTES NFR BLD MANUAL: 2 %
MONOCYTES # BLD: 0.6 K/UL (ref 0–1)
MONOCYTES NFR BLD: 8 % (ref 5–13)
MYELOCYTES NFR BLD MANUAL: 2 %
NEUTS BAND NFR BLD MANUAL: 1 % (ref 0–6)
NEUTS SEG # BLD: 3.8 K/UL (ref 1.8–8)
NEUTS SEG NFR BLD: 47 % (ref 32–75)
NRBC # BLD: 0 K/UL (ref 0–0.01)
NRBC BLD-RTO: 0 PER 100 WBC
PLATELET # BLD AUTO: 201 K/UL (ref 150–400)
PMV BLD AUTO: 12.8 FL (ref 8.9–12.9)
RBC # BLD AUTO: 4.4 M/UL (ref 3.8–5.2)
RBC MORPH BLD: ABNORMAL
WBC # BLD AUTO: 8 K/UL (ref 3.6–11)

## 2018-09-24 PROCEDURE — 00HU33Z INSERTION OF INFUSION DEVICE INTO SPINAL CANAL, PERCUTANEOUS APPROACH: ICD-10-PCS | Performed by: NURSE PRACTITIONER

## 2018-09-24 PROCEDURE — 85025 COMPLETE CBC W/AUTO DIFF WBC: CPT | Performed by: OBSTETRICS & GYNECOLOGY

## 2018-09-24 PROCEDURE — 77030011943

## 2018-09-24 PROCEDURE — 75410000000 HC DELIVERY VAGINAL/SINGLE: Performed by: OBSTETRICS & GYNECOLOGY

## 2018-09-24 PROCEDURE — 0HQ9XZZ REPAIR PERINEUM SKIN, EXTERNAL APPROACH: ICD-10-PCS | Performed by: OBSTETRICS & GYNECOLOGY

## 2018-09-24 PROCEDURE — 74011250636 HC RX REV CODE- 250/636: Performed by: OBSTETRICS & GYNECOLOGY

## 2018-09-24 PROCEDURE — 76060000078 HC EPIDURAL ANESTHESIA: Performed by: NURSE ANESTHETIST, CERTIFIED REGISTERED

## 2018-09-24 PROCEDURE — 36415 COLL VENOUS BLD VENIPUNCTURE: CPT | Performed by: OBSTETRICS & GYNECOLOGY

## 2018-09-24 PROCEDURE — 65270000029 HC RM PRIVATE

## 2018-09-24 PROCEDURE — 77030014125 HC TY EPDRL BBMI -B: Performed by: ANESTHESIOLOGY

## 2018-09-24 PROCEDURE — 74011250636 HC RX REV CODE- 250/636: Performed by: NURSE ANESTHETIST, CERTIFIED REGISTERED

## 2018-09-24 PROCEDURE — 74011250636 HC RX REV CODE- 250/636

## 2018-09-24 PROCEDURE — 75410000003 HC RECOV DEL/VAG/CSECN EA 0.5 HR: Performed by: OBSTETRICS & GYNECOLOGY

## 2018-09-24 PROCEDURE — 74011000250 HC RX REV CODE- 250

## 2018-09-24 PROCEDURE — 77010026065 HC OXYGEN MINIMUM MEDICAL AIR: Performed by: OBSTETRICS & GYNECOLOGY

## 2018-09-24 PROCEDURE — 75410000002 HC LABOR FEE PER 1 HR: Performed by: OBSTETRICS & GYNECOLOGY

## 2018-09-24 RX ORDER — OXYTOCIN/0.9 % SODIUM CHLORIDE 30/500 ML
PLASTIC BAG, INJECTION (ML) INTRAVENOUS
Status: COMPLETED
Start: 2018-09-24 | End: 2018-09-24

## 2018-09-24 RX ORDER — FENTANYL/BUPIVACAINE/NS/PF 2-1250MCG
10 PREFILLED PUMP RESERVOIR EPIDURAL CONTINUOUS
Status: DISCONTINUED | OUTPATIENT
Start: 2018-09-24 | End: 2018-09-24 | Stop reason: HOSPADM

## 2018-09-24 RX ORDER — BUPIVACAINE HYDROCHLORIDE 2.5 MG/ML
INJECTION, SOLUTION EPIDURAL; INFILTRATION; INTRACAUDAL AS NEEDED
Status: DISCONTINUED | OUTPATIENT
Start: 2018-09-24 | End: 2018-09-24 | Stop reason: HOSPADM

## 2018-09-24 RX ORDER — SODIUM CHLORIDE, SODIUM LACTATE, POTASSIUM CHLORIDE, CALCIUM CHLORIDE 600; 310; 30; 20 MG/100ML; MG/100ML; MG/100ML; MG/100ML
999 INJECTION, SOLUTION INTRAVENOUS CONTINUOUS
Status: DISCONTINUED | OUTPATIENT
Start: 2018-09-24 | End: 2018-09-24 | Stop reason: HOSPADM

## 2018-09-24 RX ORDER — HYDROCODONE BITARTRATE AND ACETAMINOPHEN 10; 325 MG/1; MG/1
1 TABLET ORAL
Status: DISCONTINUED | OUTPATIENT
Start: 2018-09-24 | End: 2018-09-26 | Stop reason: HOSPADM

## 2018-09-24 RX ORDER — IBUPROFEN 800 MG/1
800 TABLET ORAL EVERY 8 HOURS
Status: DISCONTINUED | OUTPATIENT
Start: 2018-09-24 | End: 2018-09-26 | Stop reason: HOSPADM

## 2018-09-24 RX ORDER — SODIUM CHLORIDE, SODIUM LACTATE, POTASSIUM CHLORIDE, CALCIUM CHLORIDE 600; 310; 30; 20 MG/100ML; MG/100ML; MG/100ML; MG/100ML
125 INJECTION, SOLUTION INTRAVENOUS CONTINUOUS
Status: DISCONTINUED | OUTPATIENT
Start: 2018-09-24 | End: 2018-09-24

## 2018-09-24 RX ORDER — ONDANSETRON 2 MG/ML
4 INJECTION INTRAMUSCULAR; INTRAVENOUS
Status: DISCONTINUED | OUTPATIENT
Start: 2018-09-24 | End: 2018-09-24 | Stop reason: HOSPADM

## 2018-09-24 RX ORDER — NALOXONE HYDROCHLORIDE 0.4 MG/ML
0.4 INJECTION, SOLUTION INTRAMUSCULAR; INTRAVENOUS; SUBCUTANEOUS AS NEEDED
Status: DISCONTINUED | OUTPATIENT
Start: 2018-09-24 | End: 2018-09-24 | Stop reason: HOSPADM

## 2018-09-24 RX ORDER — NALOXONE HYDROCHLORIDE 0.4 MG/ML
0.4 INJECTION, SOLUTION INTRAMUSCULAR; INTRAVENOUS; SUBCUTANEOUS AS NEEDED
Status: DISCONTINUED | OUTPATIENT
Start: 2018-09-24 | End: 2018-09-26 | Stop reason: HOSPADM

## 2018-09-24 RX ORDER — SWAB
1 SWAB, NON-MEDICATED MISCELLANEOUS DAILY
Status: DISCONTINUED | OUTPATIENT
Start: 2018-09-25 | End: 2018-09-26 | Stop reason: HOSPADM

## 2018-09-24 RX ORDER — ONDANSETRON 4 MG/1
4 TABLET, ORALLY DISINTEGRATING ORAL
Status: ACTIVE | OUTPATIENT
Start: 2018-09-24 | End: 2018-09-25

## 2018-09-24 RX ORDER — HYDROCODONE BITARTRATE AND ACETAMINOPHEN 5; 325 MG/1; MG/1
1 TABLET ORAL
Status: DISCONTINUED | OUTPATIENT
Start: 2018-09-24 | End: 2018-09-26 | Stop reason: HOSPADM

## 2018-09-24 RX ORDER — ZOLPIDEM TARTRATE 5 MG/1
5 TABLET ORAL
Status: DISCONTINUED | OUTPATIENT
Start: 2018-09-24 | End: 2018-09-26 | Stop reason: HOSPADM

## 2018-09-24 RX ORDER — LIDOCAINE HYDROCHLORIDE 10 MG/ML
20 INJECTION INFILTRATION; PERINEURAL ONCE
Status: DISCONTINUED | OUTPATIENT
Start: 2018-09-24 | End: 2018-09-24 | Stop reason: HOSPADM

## 2018-09-24 RX ORDER — TERBUTALINE SULFATE 1 MG/ML
INJECTION SUBCUTANEOUS
Status: DISCONTINUED
Start: 2018-09-24 | End: 2018-09-24

## 2018-09-24 RX ORDER — HYDROCORTISONE ACETATE PRAMOXINE HCL 2.5; 1 G/100G; G/100G
CREAM TOPICAL AS NEEDED
Status: DISCONTINUED | OUTPATIENT
Start: 2018-09-24 | End: 2018-09-26 | Stop reason: HOSPADM

## 2018-09-24 RX ORDER — LIDOCAINE HYDROCHLORIDE AND EPINEPHRINE 20; 5 MG/ML; UG/ML
INJECTION, SOLUTION EPIDURAL; INFILTRATION; INTRACAUDAL; PERINEURAL AS NEEDED
Status: DISCONTINUED | OUTPATIENT
Start: 2018-09-24 | End: 2018-09-24 | Stop reason: HOSPADM

## 2018-09-24 RX ORDER — SODIUM CHLORIDE 0.9 % (FLUSH) 0.9 %
5-10 SYRINGE (ML) INJECTION AS NEEDED
Status: DISCONTINUED | OUTPATIENT
Start: 2018-09-24 | End: 2018-09-24

## 2018-09-24 RX ORDER — OXYTOCIN/0.9 % SODIUM CHLORIDE 30/500 ML
0-25 PLASTIC BAG, INJECTION (ML) INTRAVENOUS
Status: DISCONTINUED | OUTPATIENT
Start: 2018-09-24 | End: 2018-09-24

## 2018-09-24 RX ORDER — SODIUM CHLORIDE 0.9 % (FLUSH) 0.9 %
5-10 SYRINGE (ML) INJECTION EVERY 8 HOURS
Status: DISCONTINUED | OUTPATIENT
Start: 2018-09-24 | End: 2018-09-24

## 2018-09-24 RX ORDER — OXYTOCIN/RINGER'S LACTATE 20/1000 ML
125-500 PLASTIC BAG, INJECTION (ML) INTRAVENOUS CONTINUOUS
Status: DISCONTINUED | OUTPATIENT
Start: 2018-09-24 | End: 2018-09-26 | Stop reason: HOSPADM

## 2018-09-24 RX ORDER — EPHEDRINE SULFATE 50 MG/ML
10 INJECTION, SOLUTION INTRAVENOUS
Status: DISCONTINUED | OUTPATIENT
Start: 2018-09-24 | End: 2018-09-24 | Stop reason: HOSPADM

## 2018-09-24 RX ORDER — CALCIUM CARBONATE 200(500)MG
400 TABLET,CHEWABLE ORAL
Status: DISCONTINUED | OUTPATIENT
Start: 2018-09-24 | End: 2018-09-24 | Stop reason: HOSPADM

## 2018-09-24 RX ORDER — SIMETHICONE 80 MG
80 TABLET,CHEWABLE ORAL
Status: DISCONTINUED | OUTPATIENT
Start: 2018-09-24 | End: 2018-09-26 | Stop reason: HOSPADM

## 2018-09-24 RX ORDER — MAG HYDROX/ALUMINUM HYD/SIMETH 200-200-20
30 SUSPENSION, ORAL (FINAL DOSE FORM) ORAL
Status: DISCONTINUED | OUTPATIENT
Start: 2018-09-24 | End: 2018-09-24 | Stop reason: HOSPADM

## 2018-09-24 RX ORDER — DOCUSATE SODIUM 100 MG/1
100 CAPSULE, LIQUID FILLED ORAL 2 TIMES DAILY
Status: DISCONTINUED | OUTPATIENT
Start: 2018-09-24 | End: 2018-09-26 | Stop reason: HOSPADM

## 2018-09-24 RX ORDER — DIPHENHYDRAMINE HCL 25 MG
25 CAPSULE ORAL
Status: DISCONTINUED | OUTPATIENT
Start: 2018-09-24 | End: 2018-09-26 | Stop reason: HOSPADM

## 2018-09-24 RX ADMIN — LIDOCAINE HYDROCHLORIDE AND EPINEPHRINE 4 ML: 20; 5 INJECTION, SOLUTION EPIDURAL; INFILTRATION; INTRACAUDAL; PERINEURAL at 10:22

## 2018-09-24 RX ADMIN — Medication 10 ML/HR: at 10:34

## 2018-09-24 RX ADMIN — OXYTOCIN 2 MILLI-UNITS/MIN: 10 INJECTION, SOLUTION INTRAMUSCULAR; INTRAVENOUS at 06:50

## 2018-09-24 RX ADMIN — BUPIVACAINE HYDROCHLORIDE 8 ML: 2.5 INJECTION, SOLUTION EPIDURAL; INFILTRATION; INTRACAUDAL at 10:24

## 2018-09-24 RX ADMIN — SODIUM CHLORIDE, SODIUM LACTATE, POTASSIUM CHLORIDE, AND CALCIUM CHLORIDE 1000 ML: 600; 310; 30; 20 INJECTION, SOLUTION INTRAVENOUS at 06:29

## 2018-09-24 RX ADMIN — SODIUM CHLORIDE, SODIUM LACTATE, POTASSIUM CHLORIDE, AND CALCIUM CHLORIDE 500 ML: 600; 310; 30; 20 INJECTION, SOLUTION INTRAVENOUS at 09:45

## 2018-09-24 RX ADMIN — SODIUM CHLORIDE, SODIUM LACTATE, POTASSIUM CHLORIDE, AND CALCIUM CHLORIDE 125 ML/HR: 600; 310; 30; 20 INJECTION, SOLUTION INTRAVENOUS at 11:15

## 2018-09-24 RX ADMIN — SODIUM CHLORIDE, SODIUM LACTATE, POTASSIUM CHLORIDE, AND CALCIUM CHLORIDE 1000 ML: 600; 310; 30; 20 INJECTION, SOLUTION INTRAVENOUS at 06:50

## 2018-09-24 RX ADMIN — SODIUM CHLORIDE, SODIUM LACTATE, POTASSIUM CHLORIDE, AND CALCIUM CHLORIDE 125 ML/HR: 600; 310; 30; 20 INJECTION, SOLUTION INTRAVENOUS at 07:50

## 2018-09-24 NOTE — L&D DELIVERY NOTE
Delivery Summary    Patient: Farzad Goodwin MRN: [id number]  SSN: [id number]    YOB: 1980  Age: 42 y.o. Sex: female        Labor Events:    Labor: No    Rupture Date: 2018    Rupture Time: 7:00 AM    Rupture Type: AROM    Amniotic Fluid Volume: Large     Amniotic Fluid Description:  Amniotic Fluid Odor: Clear    None     Induction: Oxytocin         Induction Date:  2018     Induction Time: 6:50 AM     Indications for Induction:       Augmentation:      Augmentation Date:      Augmentation Time:      Indications for Augmentation:      Cervical Ripening:       None    Rupture Identifier: Rupture 1     Labor complications: None     Additional complications:           Delivery Events:  Episiotomy: None    Indications for Episiotomy:      Laceration(s): First degree perineal;Other (comment)       Repaired: Yes     Number of Repair Packets: 1    Suture Type and Size: Vicryl 3-0        Estimated Blood Loss (ml):          Information for the patient's :  Franki dOell Female [[id number]]     Delivery Summary - Baby    Delivery Date: 2018   Delivery Time: 11:30 AM   Delivery Type: Vaginal, Spontaneous Delivery  Sex:  female  Gestational Age: 44w2d  Delivery Clinician:  Karishma Martinez  Living?: Living   Delivery Location: L&D             APGARS  One minute Five minutes Ten minutes   Skin Color:            Heart Rate:             Reflex Irritability:             Muscle Tone:           Respiration:             Total:               Presentation: Vertex  Position: Right Occiput Anterior  Resuscitation Method:  Suctioning-tracheal;Tactile Stimulation     Meconium Stained: None    Cord Information: 3 Vessels   Complications: None; Other(Comment) (Comment)  Cord Blood Sent?:  No    Blood Gases Sent?:  No    Placenta:  Date/Time:  11:32 AM  Removal: Spontaneous      Appearance: Normal     [place] Measurements:  Birth Weight:      Birth Length:     Head Circumference:       Chest Circumference:      Abdominal Girth:       Other Providers:   JONA ARANGO;EUNICE LEDBETTER;ISABELLA PISANO;IZA JARAMILLO Obstetrician;Primary Nurse;Primary [place] Nurse;Crna           Cord Blood Results:  Information for the patient's :  Srikanth Rain, Female [[id number]]   No results found for: ABORH, PCTABR, PCTDIG, BILI, ABORHEXT, ABORH    Information for the patient's :  Srikanth Rain, Female [[id number]]   No results found for: APH, APCO2, APO2, AHCO3, ABEC, ABDC, O2ST, SITE, [place], PHI, Aneta, PO2I, HCO3I, SO2I, IBD     Information for the patient's :  Srikanth Rain, Female [[id number]]   No results found for: EPHV, PCO2V, PO2V, HCO3V, O2STV, EBDV

## 2018-09-24 NOTE — PROGRESS NOTES
0700:  This RN responds to [facility] with primary RN Petty Menchaca. Interventions in place for non-reassuring FHR; Dr Emi Garland present. AROM, FSE, IUPC placed by Dr Emi Garland. Oxygen mask noted to be on, Pitocn paused-this RN turns off pump. Pt in left tilt position 0710:  Received SBAR from Mirian Bello RN; assuming care of pt at this time. 0745:  Assessment completed, strip reviewed, plan to restart Pitocin 
0754:  Pitocin restarted at 1 
0812:  Strip reviewed, reassuring; Pit Increased by 1 
0830:  Strip reviewed, reassuring; Pit Increased by 1 
0845:  Strip reviewed, reassuring; Pit Increased by 1 
0900:  Strip reviewed, reassuring; Pit Increased by 2 
0913:  Dr Yenny Boo at [facility], [address] 5/70/-1; early decels noted with strong UCx-received verbal order to hold Pitocin at 6 at this time 0940:  Pt c/o strong pain- (7/10)  MVU calculate at 230, pitocin decreased to 5. 
0945:  Cliff Garber CRNA contacted via [employer], orders requested. Dr Yenny Boo updated on , pit decreased, pt requesting epidural 
1000:  Bed johnson provided; Cliff Garber at bedside for pre procedure interview 1017:  Time out-see intra-procedure flowchart 1024:  Test dose/loading dose by ALLEN Jaimes 1025:  Procedure complete. Pt to left tilt, pillow to lumbar spine, peanut ball to right leg for alignment/support 1034:  Epidural infusion infusion initiated-see MAR 
1044:  Pt turned to right lateral, peanut ball and pillow adjusted 1045:  245 MVU. Pitocin not increased 1105:  SVE 7/90/0 
1110:  Straight cath with 300 cc urine return, abiel 1120:  Call to Homero Elias RN asked to call Dr Yenny Boo, request provider review strip, consider amnioinfusion due to repetetive variables, FHR down to 35 at points; Pitocin off, oxygen on. 
1126:  Dr Yenny Boo at [facility], pt trial push,  
[id number]:  Cervix reduced-set up for delivery at this time. 36:  Liveborn female infant. 1135:  See L&D PACU flowcharting for recovery. 1455:  SBAR to Arely Alaniz RN at [facility]. LEXA Corrales to transfer to MIU 
TRANSFER - OUT REPORT: 
 
Verbal report given to ALLISON Hernandez RN (name) on Solo De Los Santos  being transferred to MIU (unit) for routine progression of care Report consisted of patients Situation, Background, Assessment and  
Recommendations(SBAR). Information from the following report(s) SBAR, Procedure Summary, Intake/Output, MAR and Recent Results was reviewed with the receiving nurse. Lines:  
Peripheral IV 09/24/18 Right Hand (Active) Opportunity for questions and clarification was provided. Patient transported with: 
 Registered Nurse Tech

## 2018-09-24 NOTE — PROGRESS NOTES
09/24/18 12:48 PM 
CM met with patient and her /FOB Belkis ([phone]) to complete initial assessment and to begin discharge planning. Demographics were reviewed and confirmed. The couple also has a 9year old daughter. Family supports available locally to assist as needed. Patient has car seat, crib, clothing, and other necessary supplies. MOB is breastfeeding and has a pump to use at home. Dr. Gregg Corcoran will provide medical follow up for the baby. Patient has car seat, crib, clothing, and other necessary supplies. Denied need for [facility] and Medicaid services. Care Management Interventions PCP Verified by CM: Yes ([facility]) Mode of Transport at Discharge: Self Transition of Care Consult (CM Consult): Discharge Planning Current Support Network: Lives with Spouse, Family Lives [place] Confirm Follow Up Transport: Family Plan discussed with Pt/Family/Caregiver: Yes Discharge Location Discharge Placement: Home with family assistance TYREE Welsh

## 2018-09-24 NOTE — PROGRESS NOTES
Called to see patient secondary to prolonged decell to 60s for 4 minutes atter pitocin started at 2 mu 
arom clear fluid 
iupc and FSE applied Patient repositions O2 via FM and pitocin off  
Monitor

## 2018-09-24 NOTE — ANESTHESIA PROCEDURE NOTES
Epidural Block Start time: 9/24/2018 10:17 AM 
End time: 9/24/2018 10:25 AM 
Performed by: Gerardo Wesley Authorized by: Gerardo Wesley Pre-Procedure Indication: labor epidural   
Preanesthetic Checklist: patient identified, risks and benefits discussed, anesthesia consent, timeout performed and anesthesia consent Timeout Time: 10:17 Epidural:  
Patient position:  Seated Prep region:  Lumbar Prep: Betadine Location:  L2-3 Needle and Epidural Catheter:  
Needle Type:  Tuohy Needle Gauge:  17 G Injection Technique:  Loss of resistance using saline Attempts:  1 Catheter Size:  18 G Catheter at Skin Depth (cm):  10 Depth in Epidural Space (cm):  5 Events: no blood with aspiration, no cerebrospinal fluid with aspiration, no paresthesia and negative aspiration test   
Test Dose:  Lidocaine 1.5% w/ epi and negative Assessment:  
Catheter Secured:  Tegaderm and tape Insertion:  Uncomplicated Patient tolerance:  Patient tolerated the procedure well with no immediate complications

## 2018-09-24 NOTE — H&P
History & Physical 
 
Name: Kanchan Murguia MRN: [id number]  SSN: [id number] YOB: 1980  Age: 42 y.o. Sex: female Subjective:  
 
Estimated Date of Delivery: 18 OB History  Para Term  AB Living 3 1 1 0 1 1 SAB TAB Ectopic Molar Multiple Live Births 0 1 0 0 0 1 # Outcome Date GA Lbr Raz/2nd Weight Sex Delivery Anes PTL Lv  
3 Current 2 Term 11 40w6d  3.317 kg F Vag-Spont Birth Comments: System Generated. Please review and update pregnancy details. 1 TAB Ms. Jami Pedersen is admitted with pregnancy at 39w2d for induction of labor due to favorable cervix at term. Prenatal course was normal.  Please see prenatal records for details. Past Medical History:  
Diagnosis Date  Abnormal Pap smear of cervix 2016 Neg pap +HPV   history  Acquired hypothyroidism   
 hyperthyroid, graves disease  Anxiety   
 recent diagnosis  Constipation  Frequent urination  GERD (gastroesophageal reflux disease)  HSV-2 infection 2014  HX OTHER MEDICAL   
 compression  Infertility   
 clomid  Leg pain  Morbid obesity ([address]  PCOS (polycystic ovarian syndrome)  Rapid pulse  Reflux  Sinusitis  Snoring  Thyroid disease  Varicose vein Past Surgical History:  
Procedure Laterality Date  GASTRIC BYPASS,OBESE<150CM MELIZA-EN-Y    
 HX GASTRIC BYPASS  10/2/13  HX HEENT  orbital surgery  
 orbital decompression. Social History Occupational History  Not on file. Social History Main Topics  Smoking status: Never Smoker  Smokeless tobacco: Never Used  Alcohol use No  
 Drug use: No  
 Sexual activity: Yes  
  Partners: Male Birth control/ protection: None Family History Problem Relation Age of Onset  Hypertension Mother  Diabetes Mother  High Cholesterol Mother  Hypertension Father  Diabetes Father  Heart Disease Father  Stroke Father  Hypertension Maternal Grandmother  Diabetes Maternal Grandmother  Hypertension Maternal Grandfather  Diabetes Maternal Grandfather  Stroke Maternal Grandfather  Hypertension Paternal Grandmother  Diabetes Paternal Grandmother  Stroke Paternal Grandmother  Hypertension Paternal Grandfather  Diabetes Paternal Grandfather No Known Allergies Prior to Admission medications Medication Sig Start Date End Date Taking? Authorizing Provider REID [id number] fum/folic ac (PRENATAL PO) Take  by mouth. Historical Provider  
valACYclovir (VALTREX) 500 mg tablet Take 1 Tab by mouth two (2) times a day. 8/22/18   Roger Kawasaki, MD  
levothyroxine (LEVOXYL) 112 mcg tablet Take 125 mcg by mouth Daily (before breakfast). Historical Provider Review of Systems: Constitutional: negative Respiratory: negative Cardiovascular: negative Gastrointestinal: negative Genitourinary:negative Hematologic/Lymphatic: negative Musculoskeletal:negative Neurological: negative Behavioral/Psychiatric: negative Objective:  
 
Vitals: 
Vitals:  
 09/24/18 8265 09/24/18 7333 09/24/18 2785 BP: 92/71 Pulse: 64 SpO2:  99% Weight:   83.5 kg (184 lb) Height:   5' 5\" (1.651 m) Physical Exam: 
Patient without distress. Heart: Regular rate and rhythm Lung: clear to auscultation throughout lung fields and normal respiratory effort Abdomen: soft, nontender Perineum: blood absent, amniotic fluid absent Cervical Exam: 3/50/-1 Membranes:  Intact Fetal Heart Rate: FHR 140s irregular ctx noted Prenatal Labs:  
Lab Results Component Value Date/Time ABO/Rh(D) A POS 08/27/2011 06:50 AM  
 Rubella, External immune 02/08/2018 HBsAg, External neg 02/08/2018 HIV, External neg 02/08/2018 RPR, External non reactive 02/08/2018 Gonorrhea, External neg 02/08/2018 Chlamydia, External neg 02/08/2018 ABO,Rh A positive 02/08/2018 GrBStrep, External Negative 09/05/2018 Impression/Plan: Active Problems: 
  Pregnancy (8/27/2011) Plan: Admit for induction of labor. Group B Strep negative. Signed By:  Enrique Masterson MD   
 September 24, 2018

## 2018-09-24 NOTE — PROGRESS NOTES
Bedside and Verbal shift change report given to Reginald Gitelman RN (oncoming nurse) by Nelson Roblero RN (offgoing nurse). Report included the following information SBAR, Intake/Output, MAR and Recent Results.

## 2018-09-24 NOTE — H&P
Labor Progress Note Patient seen, fetal heart rate and contraction pattern evaluated, patient examined. Visit Vitals  /75  Pulse 60  Temp 98.8 °F (37.1 °C)  Resp 15  Ht 5' 5\" (1.651 m)  Wt 182 lb (82.6 kg)  LMP 12/23/2017 (Exact Date)  SpO2 100%  Breastfeeding No  
 BMI 30.29 kg/m2 Physical Exam: 
Cervical Exam:  5/70 %/-1/  
Membranes:  ruptured Uterine Activity: Frequency: Every 2-5 minutes Fetal Heart Rate: Reactive Accelerations: yes Decelerations: variable and early, mild and intermittent Assessment/Plan: 
Reassuring fetal status, Continue plan for vaginal delivery.  Pt requesting epidural

## 2018-09-24 NOTE — LACTATION NOTE
This note was copied from a baby's chart. Discussed with mother her plan for feeding. Reviewed the benefits of exclusive breast milk feeding during the hospital stay. Informed her of the risks of using formula to supplement in the first few days of life as well as the benefits of successful breast milk feeding; referred her to the Breastfeeding booklet about this information. She acknowledges understanding of information reviewed and states that it is her plan to breastfeed her infant. Will support her choice and offer additional information as needed. Reviewed breastfeeding basics:  How milk is made and normal  breastfeeding behaviors discussed. Supply and demand,  stomach size, early feeding cues, skin to skin bonding with comfortable positioning and baby led latch-on reviewed. How to identify signs of successful breastfeeding sessions reviewed; education on assymetrical latch, signs of effective latching vs shallow, in-effective latching, normal  feeding frequency and duration and expected infant output discussed. Normal course of breastfeeding discussed including the AAP's recommendation that children receive exclusive breast milk feedings for the first six months of life with breast milk feedings to continue through the first year of life and/or beyond as complimentary table foods are added. Breastfeeding Booklet and Warm line information provided with discussion. Discussed typical  weight loss and the importance of pediatrician appointment within 24-48 hours of discharge, at 2 weeks of life and normalcy of requesting pediatric weight checks as needed in between visits. Pt will successfully establish breastfeeding by feeding in response to early feeding cues  
or wake every 3h, will obtain deep latch, and will keep log of feedings/output. Taught to BF at hunger cues and or q 2-3 hrs and to offer 10-20 drops of hand expressed colostrum at any non-feeds. Breast Assessment Left Breast: Medium Left Nipple: Inverted Right Breast: Medium Right Nipple: Inverted Breast- Feeding Assessment Attends Breast-Feeding Classes: No 
Breast-Feeding Experience: Yes (2 weeks) Breast Trauma/Surgery: No 
Type/Quality: Good Lactation Consultant Visits Breast-Feedings: Good Mother/Infant Observation Mother Observation: Alignment, Breast comfortable Infant Observation: Opens mouth, Lips flanged, upper, Lips flanged, lower, Latches nipple and aereolae, Rhythmic suck LATCH Documentation Latch: Repeated attempts, hold nipple in mouth, stimulate to suck Audible Swallowing: A few with stimulation Type of Nipple: Everted (after stimulation) Comfort (Breast/Nipple): Soft/non-tender Hold (Positioning): No assist from staff, mother able to position/hold infant LATCH Score: 8 Mom Tere Doran  first baby 2 weeks due to inverted nipples. It was hard using the shield. This baby latched and is feeding well. \"

## 2018-09-24 NOTE — IP AVS SNAPSHOT
Alexandra Alves 
 
 
 [address] 
[phone] Patient: Noel Carrel MRN: [id number] [facility]70/3/8470 About your hospitalization You were admitted on:  September 24, 2018 You last received care in the:  [facility] You were discharged on:  September 26, 2018 Why you were hospitalized Your primary diagnosis was:  Not on File Your diagnoses also included:  Pregnancy Follow-up Information Follow up With Details Comments Contact Info None   None (395) Patient stated that they have no PCP Your Scheduled Appointments Wednesday September 26, 2018  2:10 PM EDT  
OB VISIT with MD Albino Kathleen ([address] [facility]) [address]  
[phone] Discharge Orders None A check gerard indicates which time of day the medication should be taken. My Medications START taking these medications Instructions Each Dose to Equal  
 Morning Noon Evening Bedtime  
 ibuprofen 800 mg tablet Commonly known as:  MOTRIN Your last dose was: Your next dose is: Take 1 Tab by mouth every six (6) hours as needed for Pain. 800 mg CONTINUE taking these medications Instructions Each Dose to Equal  
 Morning Noon Evening Bedtime  
 ferric gluconate (FERRLECIT) infusion Your last dose was: Your next dose is:    
   
   
 by IntraVENous route daily. Indications: Iron Deficiency Anemia, Gaastric sleeve surgery 5 yrs ago LEVOXYL 112 mcg tablet Generic drug:  levothyroxine Your last dose was: Your next dose is: Take 125 mcg by mouth Daily (before breakfast). 125 mcg PRENATAL PO Your last dose was: Your next dose is: Take  by mouth. STOP taking these medications   
 valACYclovir 500 mg tablet Commonly known as:  VALTREX Where to Get Your Medications These medications were sent to [address], [address] [facility]  [address], [address] Phone:  [phone]  
  ibuprofen 800 mg tablet Discharge Instructions POST DELIVERY DISCHARGE INSTRUCTIONS Name: Julianne Mcclellan YOB: 1980 Primary Diagnosis: Active Problems: 
  Pregnancy (2011) General:  
 
Diet/Diet Restrictions: 
Eight 8-ounce glasses of fluid daily (water, juices); avoid excessive caffeine intake. Meals/snacks as desired which are high in fiber and carbohydrates and low in fat and cholesterol. Medications:  
{Medication reconciliation information is now added to the patient's AVS automatically when it is printed. There is no need to use this SmartLink in discharge instructions. Highlight this text and delete it to clear this message} Physical Activity / Restrictions / Safety:  
 
Avoid heavy lifting, no more that 8 lbs. For 2-3 weeks; No driving while taking narcotic pain medication. Post  patients should not drive until pain free. No intercourse 4-6 weeks, no douching or tampon use. May resume exercise in 6 weeks. Discharge Instructions/Special Treatment/Home Care Needs:  
 
Continue prenatal vitamins. Continue to use squirt bottle with warm water on your episiotomy after each bathroom use until bleeding stops. If steri-strips applied to your incision, remove in 7 days. Take stool softeners daily. Call your doctor for the following:  
 
Fever over 101 degrees by mouth. Vaginal bleeding heavier than a normal menstrual period or lost larger than a golf ball.  
Red streaks or increased swelling of legs, painful red streaks on your breast. 
 Painful urination, or increased pain, redness or discharge with your incision. Pain Management:  
 
Pain Management:  
Take Acetaminophen (Tylenol) or Ibuprofen (Advil, Motrin), as directed for pain. Use a warm Sitz bath 3 times daily to relieve episiotomy or hemorrhoidal discomfort. Heating pad to  incision as needed. For hemorrhoidal discomfort, use Tucks and Anusol cream as needed and directed. Follow-Up Care:  
 
Appointment with MD: Follow-up Appointments Procedures  FOLLOW UP VISIT Appointment in: 6 Weeks Standing Status:   Standing Number of Occurrences:   1 Order Specific Question:   Appointment in Answer:   6 Weeks Telephone number: [phone] Signed By: Joya Burden MD                                                                                                   Date: 2018 Time: 9:25 AM 
 
 
  
  
  
[employer] Announcement We are excited to announce that we are making your provider's discharge notes available to you in [employer]. You will see these notes when they are completed and signed by the physician that discharged you from your recent hospital stay. If you have any questions or concerns about any information you see in [employer], please call the Health Information Department where you were seen or reach out to your Primary Care Provider for more information about your plan of care. Introducing [facility]! Dear Irma Snell: 
Thank you for requesting a [employer] account. Our records indicate that you already have an active [employer] account. You can access your account anytime at [email or web address] Did you know that you can access your hospital and ER discharge instructions at any time in [employer]? You can also review all of your test results from your hospital stay or ER visit. Additional Information If you have questions, please visit the Frequently Asked Questions section of the [email or web address] website at [email or web address] Remember, [employer] is NOT to be used for urgent needs. For medical emergencies, dial 911. Now available from your iPhone and Android! Introducing Kaushik Witt As a Eugenie Charier patient, I wanted to make you aware of our electronic visit tool called Kaushik Witt. [employer] 24/7 allows you to connect within minutes with a medical provider 24 hours a day, seven days a week via a mobile device or tablet or logging into a secure website from your computer. You can access Kaushik Witt from anywhere in the [place]. A virtual visit might be right for you when you have a simple condition and feel like you just dont want to get out of bed, or cant get away from work for an appointment, when your [facility] provider is not available (evenings, weekends or holidays), or when youre out of town and need minor care. Electronic visits cost only $49 and if the citiservi/XAware provider determines a prescription is needed to treat your condition, one can be electronically transmitted to a nearby pharmacy*. Please take a moment to enroll today if you have not already done so. The enrollment process is free and takes just a few minutes. To enroll, please download the [employer]i/XAware abdoulaye to your tablet or phone, or visit [email or web address] to enroll on your computer. And, as an [address] [place] [address] patient with a [employer] account, the results of your visits will be scanned into your electronic medical record and your primary care provider will be able to view the scanned results. We urge you to continue to see your [facility] for your ongoing medical care. And while your primary care provider may not be the one available when you seek a Kaushik Witt virtual visit, the peace of mind you get from getting a real diagnosis real time can be priceless. For more information on Kaushik Witt, view our Frequently Asked Questions (FAQs) at [email or web address]. Sincerely, 
 
Abdiel Servin MD 
Chief Medical Officer [employer] *:  certain medications cannot be prescribed via Kaushik Witt Providers Seen During Your Hospitalization Provider Specialty Primary office phone Danielle Seaman MD Obstetrics & Gynecology [phone] Your Primary Care Physician (PCP) Primary Care Physician Office Phone Office Fax NONE ** None ** ** None ** You are allergic to the following No active allergies Recent Documentation Height Weight Breastfeeding? BMI OB Status Smoking Status 1.651 m 82.6 kg Unknown 30.29 kg/m2 Recent pregnancy Never Smoker Emergency Contacts Name Discharge Info Relation Home Work Mobile Belkis Henry DISCHARGE CAREGIVER [3] Spouse [3] [phone] Patient Belongings The following personal items are in your possession at time of discharge: 
  Dental Appliances: None  Visual Aid: Contacts, Glasses      Home Medications: None   Jewelry: Earrings  Clothing: At bedside    Other Valuables: Cell Phone, Purse  Personal Items Sent to Safe: none Please provide this summary of care documentation to your next provider. Signatures-by signing, you are acknowledging that this After Visit Summary has been reviewed with you and you have received a copy. Patient Signature:  ____________________________________________________________ Date:  ____________________________________________________________  
  
Eugene Dubon Provider Signature:  ____________________________________________________________ Date:  ____________________________________________________________

## 2018-09-24 NOTE — ANESTHESIA PREPROCEDURE EVALUATION
Anesthetic History No history of anesthetic complications Review of Systems / Medical History Patient summary reviewed, nursing notes reviewed and pertinent labs reviewed Pulmonary Within defined limits Neuro/Psych Within defined limits Cardiovascular Within defined limits GI/Hepatic/Renal 
  
GERD: well controlled Endo/Other Hypothyroidism Other Findings Physical Exam 
 
Airway Mallampati: II 
TM Distance: 4 - 6 cm Neck ROM: normal range of motion Cardiovascular Regular rate and rhythm,  S1 and S2 normal,  no murmur, click, rub, or gallop Rhythm: regular Rate: normal 
 
 
 
 Dental 
No notable dental hx Pulmonary Breath sounds clear to auscultation Abdominal 
GI exam deferred Other Findings Anesthetic Plan ASA: 2 Anesthesia type: epidural 
 
 
 
 
 
Anesthetic plan and risks discussed with: Patient Plan discussed prior to placement of epidural. Opportunity for questions given and answered. Patient ready for anesthesia.

## 2018-09-24 NOTE — IP AVS SNAPSHOT
[address] [phone] Patient: Farshad Joya MRN: [id number] YCQ:38/9/9004 A check gerard indicates which time of day the medication should be taken. My Medications START taking these medications Instructions Each Dose to Equal  
 Morning Noon Evening Bedtime  
 ibuprofen 800 mg tablet Commonly known as:  MOTRIN Your last dose was: Your next dose is: Take 1 Tab by mouth every six (6) hours as needed for Pain. 800 mg CONTINUE taking these medications Instructions Each Dose to Equal  
 Morning Noon Evening Bedtime  
 ferric gluconate (FERRLECIT) infusion Your last dose was: Your next dose is:    
   
   
 by IntraVENous route daily. Indications: Iron Deficiency Anemia, Gaastric sleeve surgery 5 yrs ago LEVOXYL 112 mcg tablet Generic drug:  levothyroxine Your last dose was: Your next dose is: Take 125 mcg by mouth Daily (before breakfast). 125 mcg PRENATAL PO Your last dose was: Your next dose is: Take  by mouth. STOP taking these medications   
 valACYclovir 500 mg tablet Commonly known as:  VALTREX Where to Get Your Medications These [facility] to [facility], [address] [facility]  [address] [facility], [address] [address] Phone:  [phone]  
  ibuprofen 800 mg tablet

## 2018-09-24 NOTE — IP AVS SNAPSHOT
Summary of Care Report The Summary of Care report has been created to help improve care coordination. Users with access to [employer] or [address] (Web-based application) may access additional patient information including the Discharge Summary. If you are not currently a [address] user and need more information, please call the number listed below in the [place] [address] section and ask to be connected with Medical Records. Facility Information Name Address Phone [address] [facility] [address] [phone] Patient Information Patient Name Sex  Joseph Wong ([id number]) Female 1980 Discharge Information Admitting Provider Service Area Unit Aly Mayo MD / [phone] [facility] 3 Mother Infant / [phone] Discharge Provider Discharge Date/Time Discharge Disposition Destination (none) 2018 (Pending) AHR (none) Patient Language Language ENGLISH [13] Hospital Problems as of 2018  Reviewed: 2018  3:04 PM by Aly Mayo MD  
  
  
  
 Class Noted - Resolved Last Modified POA Active Problems Pregnancy  2011 - Present 2018 by Aly Mayo MD Unknown Entered by Memo Montalvo MD  
  
Non-Hospital Problems as of 2018  Reviewed: 2018  3:04 PM by Aly Mayo MD  
  
  
  
 Class Noted - Resolved Last Modified Active Problems Reflux  Unknown - Present 2010 by Luz Negron RN Entered by Luz Negron RN Constipation  Unknown - Present 2010 by Luz Negron RN Entered by Luz Negron RN Sinusitis  Unknown - Present 2010 by Luz Negron RN Entered by Lzu Negron RN  
  Morbid obesity [facility])  Unknown - Present 10/3/2013   Entered by Luz Negron RN  
 Polycystic ovarian syndrome  11/1/2010 - Present 11/1/2010 by Bony De La Cruz, NP Entered by Bony De La Cruz, NP Infertility female  11/1/2010 - Present 11/1/2010 by Bony De La Cruz, NP Entered by Bony De La Cruz, NP Urinary incontinence  11/1/2010 - Present 11/1/2010 by Bony De La Cruz, NP Entered by Bony De La Cruz, NP Heartburn  11/1/2010 - Present 11/1/2010 by Bony De La Cruz, NP Entered by Bony De La Cruz, NP  
  SOB (shortness of breath) on exertion  11/1/2010 - Present 11/1/2010 by Bony De La Cruz, NP Entered by Bony De La Cruz, NP Hypothyroidism  11/1/2010 - Present 11/1/2010 by Bony De La Cruz, NP Entered by Bony De La Cruz, NP Low back pain  11/1/2010 - Present 11/1/2010 by Bony De La Cruz, NP Entered by Bony De La Cruz, NP Active labor  8/27/2011 - Present 8/29/2011 Entered by Christine Ramos MD  
  Snoring  7/8/2013 - Present 7/8/2013 by Francesca Ruth, NP Entered by Francesca Ruth, NP Dizziness  8/6/2015 - Present 8/6/2015 by Alvaro Feldman MD  
  Entered by Alvaro Feldman MD  
  Fatigue  8/6/2015 - Present 8/6/2015 by Alvaro Feldman MD  
  Entered by Alvaro Feldman MD  
  Supervision of elderly multigravida in second trimester  3/20/2018 - Present 9/19/2018 by Danny Pereira Entered by Jaz Elise Overview Addendum 9/19/2018  3:15 PM by Danny Pereira Intrauterine pregnancy with the following problems identified: 
EDC 9/29/2018 by D=US Hypothyroid - endocrine, hx of graves disease UTI - E. 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 eval neg Inheritest- normal 
Panorama low risk-female 3/16/18; MSAFP neg Flu vaccine given 9/12/18 IOL 9/24/18. Pt aware. You are allergic to the following No active allergies Current Discharge Medication List  
  
START taking these medications Dose & Instructions Dispensing Information Comments  
 ibuprofen 800 mg tablet Commonly known as:  MOTRIN Dose:  800 mg Take 1 Tab by mouth every six (6) hours as needed for Pain. Quantity:  60 Tab Refills:  0 CONTINUE these medications which have NOT CHANGED Dose & Instructions Dispensing Information Comments  
 ferric gluconate (FERRLECIT) infusion  
 by IntraVENous route daily. Indications: Iron Deficiency Anemia, Gaastric sleeve surgery 5 yrs ago Refills:  0 LEVOXYL 112 mcg tablet Generic drug:  levothyroxine Dose:  125 mcg Take 125 mcg by mouth Daily (before breakfast). Refills:  0 PRENATAL PO Take  by mouth. Refills:  0 STOP taking these medications Comments  
 valACYclovir 500 mg tablet Commonly known as:  VALTREX Current Immunizations Name Date Influenza Vaccine (Quad) PF 9/12/2018 Tdap 7/24/2018 Surgery Information ID Date/Time Status Primary Surgeon All Procedures Location [id number] 9/24/2018 Complete   ZZANESTHESIA [facility] - DO NOT SCHEDULE Follow-up Information Follow up With Details Comments Contact Info None   None (395) Patient stated that they have no PCP Discharge Instructions POST DELIVERY DISCHARGE INSTRUCTIONS Name: Laura Fulton YOB: 1980 Primary Diagnosis: Active Problems: 
  Pregnancy (8/27/2011) General:  
 
Diet/Diet Restrictions: 
Eight 8-ounce glasses of fluid daily (water, juices); avoid excessive caffeine intake. Meals/snacks as desired which are high in fiber and carbohydrates and low in fat and cholesterol. Medications:  
{Medication reconciliation information is now added to the patient's AVS automatically when it is printed. There is no need to use this SmartLink in discharge instructions. Highlight this text and delete it to clear this message} Physical Activity / Restrictions / Safety: Avoid heavy lifting, no more that 8 lbs. For 2-3 weeks; No driving while taking narcotic pain medication. Post  patients should not drive until pain free. No intercourse 4-6 weeks, no douching or tampon use. May resume exercise in 6 weeks. Discharge Instructions/Special Treatment/Home Care Needs:  
 
Continue prenatal vitamins. Continue to use squirt bottle with warm water on your episiotomy after each bathroom use until bleeding stops. If steri-strips applied to your incision, remove in 7 days. Take stool softeners daily. Call your doctor for the following:  
 
Fever over 101 degrees by mouth. Vaginal bleeding heavier than a normal menstrual period or lost larger than a golf ball. Red streaks or increased swelling of legs, painful red streaks on your breast. 
Painful urination, or increased pain, redness or discharge with your incision. Pain Management:  
 
Pain Management:  
Take Acetaminophen (Tylenol) or Ibuprofen (Advil, Motrin), as directed for pain. Use a warm Sitz bath 3 times daily to relieve episiotomy or hemorrhoidal discomfort. Heating pad to  incision as needed. For hemorrhoidal discomfort, use Tucks and Anusol cream as needed and directed. Follow-Up Care:  
 
Appointment with MD: Follow-up Appointments Procedures  FOLLOW UP VISIT Appointment in: 6 Weeks Standing Status:   Standing Number of Occurrences:   1 Order Specific Question:   Appointment in Answer:   6 Weeks Telephone number: [phone] Signed By: Kimi Grajeda MD                                                                                                   Date: 2018 Time: 9:25 AM 
 
 
Chart Review Routing History Recipient Method Report Sent By Fanta Soto In [employer] IP Auto Routed Jaydon Fernandez MD [[id number]] 10/2/2013  8:07 PM 10/02/2013 Rashida Bee  In [employer] Routed Jaydon Fernandez MD [[id number]] 10/2/2013 8:07 PM 10/02/2013 Danita Todd MD  
Phone: [phone] In [facility] CUSTOM LAB REPORT Ami Poncho [[id number]] 4/25/2017 12:44 PM 4/10/2017 Danita Todd MD  
Phone: [phone] In [facility] Sheryl Benavidez [[id number]] 3/2/2018 10:41 AM 3/2/2018 Danita Todd MD  
Phone: [phone] In [facility] IP AMB RESULT REPORT IMAGING Ami Poncho [[id number]] 3/15/2018  3:42 PM 10/2/2017 Danita Todd MD  
Phone: [phone] In [facility] CUSTOM LAB REPORT Ami Loaizaro [[id number]] 3/26/2018  9:51 AM 3/16/2018 Danita Todd MD  
Phone: [phone] In [facility] Sheryl Benavidez [[id number]] 3/27/2018 11:21 AM 3/20/2018 Danita Todd MD  
Phone: [phone] In [facility] Sheryl Benavidez [[id number]] 4/2/2018  3:47 PM 4/2/2018 Danita Todd MD  
Phone: [phone] In [facility] CUSTOM LAB REPORT Ami Isaac [[id number]] 4/18/2018  7:19 AM 4/13/2018 Danita Todd MD  
Phone: [phone] In [facility] CUSTOM LAB REPORT Sheryl Benavidez [[id number]] 6/18/2018  9:35 AM 6/14/2018 Danita Todd MD  
Phone: [phone] In [facility] CUSTOM LAB REPORT Sheryl Benavidez [[id number]] 6/18/2018  9:36 AM 6/15/2018

## 2018-09-25 PROCEDURE — 74011250637 HC RX REV CODE- 250/637: Performed by: OBSTETRICS & GYNECOLOGY

## 2018-09-25 PROCEDURE — 65270000029 HC RM PRIVATE

## 2018-09-25 RX ADMIN — DOCUSATE SODIUM 100 MG: 100 CAPSULE, LIQUID FILLED ORAL at 08:04

## 2018-09-25 RX ADMIN — DOCUSATE SODIUM 100 MG: 100 CAPSULE, LIQUID FILLED ORAL at 16:41

## 2018-09-25 RX ADMIN — Medication 1 TABLET: at 08:04

## 2018-09-25 RX ADMIN — MUPIROCIN: 20 OINTMENT TOPICAL at 08:05

## 2018-09-25 RX ADMIN — IBUPROFEN 800 MG: 800 TABLET ORAL at 08:04

## 2018-09-25 RX ADMIN — IBUPROFEN 800 MG: 800 TABLET ORAL at 00:00

## 2018-09-25 RX ADMIN — IBUPROFEN 800 MG: 800 TABLET ORAL at 16:41

## 2018-09-25 NOTE — PROGRESS NOTES
Post-Partum Day Number 1 Progress Note Sylwia Madison Information for the patient's :  Steve Hurtado, Female [[id number]] Vaginal, Spontaneous Delivery Patient doing well without significant complaint. Voiding without difficulty, normal lochia. Pt is Breastfeeding without difficulty. Vitals: 
Visit Vitals  /66  Pulse 60  Temp 98.2 °F (36.8 °C)  Resp 16  
 Ht 5' 5\" (1.651 m)  Wt 182 lb (82.6 kg)  LMP 2017 (Exact Date)  SpO2 99%  Breastfeeding Unknown  BMI 30.29 kg/m2 Temp (24hrs), Av.2 °F (36.8 °C), Min:98.2 °F (36.8 °C), Max:98.2 °F (36.8 °C) Exam:   Patient without distress. Abdomen soft, fundus firm, nontender Perineum with normal lochia noted. Lower extremities are negative for swelling, cords or tenderness. Labs:  
 
Lab Results Component Value Date/Time  WBC 8.0 2018 06:28 AM  
 WBC 8.4 2018 03:12 PM  
 WBC 7.7 2018 01:59 PM  
 WBC 8.9 2017 11:50 AM  
 WBC 6.7 2015 11:43 AM  
 WBC 10.6 10/03/2013 03:25 AM  
 WBC 8.4 2013 02:23 PM  
 WBC 9.6 2011 06:50 AM  
 HGB 11.1 (L) 2018 06:28 AM  
 HGB 10.5 (L) 2018 03:12 PM  
 HGB 10.3 (L) 2018 01:59 PM  
 HGB 10.0 (L) 2017 11:50 AM  
 HGB 11.4 2015 11:43 AM  
 HGB 12.3 10/03/2013 03:25 AM  
 HGB 13.2 2013 02:23 PM  
 HGB 12.0 2011 06:50 AM  
 HCT 35.8 2018 06:28 AM  
 HCT 32.0 (L) 2018 03:12 PM  
 HCT 31.3 (L) 2018 01:59 PM  
 HCT 33.2 (L) 2017 11:50 AM  
 HCT 36.9 2015 11:43 AM  
 HCT 37.5 10/03/2013 03:25 AM  
 HCT 41.2 2013 02:23 PM  
 HCT 35.0 2011 06:50 AM  
 PLATELET 491  06:28 AM  
 PLATELET 977  03:12 PM  
 PLATELET 738  01:59 PM  
 PLATELET 842  11:50 AM  
 PLATELET 141  11:43 AM  
 PLATELET 961  03:25 AM  
 PLATELET 308  02:23 PM  
 PLATELET 839 40/44/8825 06:50 AM  
 Hgb, External 10.3 07/24/2018 Hgb, External 13.3 02/08/2018 Hgb, External 13.3 12/15/2010 Hct, External 31.3 07/24/2018 Hct, External 41.2 02/08/2018 Hct, External 40.8 12/15/2010 Platelet cnt., External 218 07/24/2018 Platelet cnt., External 231 02/08/2018 No results found for this or any previous visit (from the past 24 hour(s)). Assessment: Doing well, post partum day 1 Plan: 1. Continue routine postpartum and perineal care as well as maternal education.

## 2018-09-25 NOTE — PROGRESS NOTES
Bedside shift change report given to KENDRA Atkins RNC (oncoming nurse) by Brian Harvey RNC (offgoing nurse). Report included the following information SBAR, Kardex, Intake/Output, MAR and Recent Results.

## 2018-09-25 NOTE — PROGRESS NOTES
Pt will successfully establish breastfeeding by feeding in response to early feeding cues  
or wake every 3h, will obtain deep latch, and will keep log of feedings/output. Taught to BF at hunger cues and or q 2-3 hrs and to offer 10-20 drops of hand expressed colostrum at any non-feeds. Breast Assessment Left Breast: Large Left Nipple: Everted Right Breast: Large Right Nipple: Everted Breast- Feeding Assessment Attends Breast-Feeding Classes: No 
Breast-Feeding Experience: Yes (2 weeks) Breast Trauma/Surgery: No 
Type/Quality: Good Lactation Consultant Visits Breast-Feedings: Good Mother/Infant Observation Mother Observation: Alignment, Breast comfortable, Close hold Infant Observation: Opens mouth LATCH Documentation Latch: Repeated attempts, hold nipple in mouth, stimulate to suck Audible Swallowing: None Type of Nipple: Everted (after stimulation) Comfort (Breast/Nipple): Filling, red/small blisters/bruises, mild/mod discomfort Hold (Positioning): No assist from staff, mother able to position/hold infant LATCH Score: 6

## 2018-09-26 VITALS
HEIGHT: 65 IN | HEART RATE: 57 BPM | SYSTOLIC BLOOD PRESSURE: 101 MMHG | DIASTOLIC BLOOD PRESSURE: 60 MMHG | OXYGEN SATURATION: 97 % | BODY MASS INDEX: 30.32 KG/M2 | WEIGHT: 182 LBS | RESPIRATION RATE: 16 BRPM | TEMPERATURE: 98.1 F

## 2018-09-26 PROCEDURE — 74011250637 HC RX REV CODE- 250/637: Performed by: OBSTETRICS & GYNECOLOGY

## 2018-09-26 RX ORDER — IBUPROFEN 800 MG/1
800 TABLET ORAL
Qty: 60 TAB | Refills: 0 | Status: SHIPPED | OUTPATIENT
Start: 2018-09-26 | End: 2018-12-07

## 2018-09-26 RX ADMIN — IBUPROFEN 800 MG: 800 TABLET ORAL at 01:46

## 2018-09-26 RX ADMIN — IBUPROFEN 800 MG: 800 TABLET ORAL at 10:50

## 2018-09-26 RX ADMIN — DOCUSATE SODIUM 100 MG: 100 CAPSULE, LIQUID FILLED ORAL at 10:50

## 2018-09-26 RX ADMIN — Medication 1 TABLET: at 10:51

## 2018-09-26 NOTE — PROGRESS NOTES
1202: Patient discharged to home. Discharge instructions and education completed and patient reported she had no more questions. Bands verified on patient and infant, see footprint sheet. Infant placed in car seat by parent. Prescriptions:  
None

## 2018-09-26 NOTE — DISCHARGE INSTRUCTIONS
POST DELIVERY DISCHARGE INSTRUCTIONS    Name: Tracie Stinson  YOB: 1980  Primary Diagnosis: Active Problems:    Pregnancy (2011)        General:     Diet/Diet Restrictions:  Eight 8-ounce glasses of fluid daily (water, juices); avoid excessive caffeine intake. Meals/snacks as desired which are high in fiber and carbohydrates and low in fat and cholesterol. Medications:         Physical Activity / Restrictions / Safety:     Avoid heavy lifting, no more that 8 lbs. For 2-3 weeks; No driving while taking narcotic pain medication. Post  patients should not drive until pain free. No intercourse 4-6 weeks, no douching or tampon use. May resume exercise in 6 weeks. Discharge Instructions/Special Treatment/Home Care Needs:     Continue prenatal vitamins. Continue to use squirt bottle with warm water on your episiotomy after each bathroom use until bleeding stops. If steri-strips applied to your incision, remove in 7 days. Take stool softeners daily. Call your doctor for the following:     Fever over 101 degrees by mouth. Vaginal bleeding heavier than a normal menstrual period or lost larger than a golf ball. Red streaks or increased swelling of legs, painful red streaks on your breast.  Painful urination, or increased pain, redness or discharge with your incision. Pain Management:     Pain Management:   Take Acetaminophen (Tylenol) or Ibuprofen (Advil, Motrin), as directed for pain. Use a warm Sitz bath 3 times daily to relieve episiotomy or hemorrhoidal discomfort. Heating pad to  incision as needed. For hemorrhoidal discomfort, use Tucks and Anusol cream as needed and directed.     Follow-Up Care:     Appointment with MD:   Follow-up Appointments   Procedures    FOLLOW UP VISIT Appointment in: 6 Weeks     Standing Status:   Standing     Number of Occurrences:   1     Order Specific Question:   Appointment in     Answer:   6 Weeks     Telephone number: [phone]    Signed By: Roger Kawasaki, MD                                                                                                   Date: 9/26/2018 Time: 9:25 AM

## 2018-09-26 NOTE — LACTATION NOTE
This note was copied from a baby's chart. Mother resting in bed. Assisted mother with waking baby, positioning, and latching. Assisted mother with waking baby and positioning in side-lying and biological.  Discharge and engorgement reviewed. Mothers questions answered. Reviewed breastfeeding techniques and positions with mother until found a position she was most comfortable with. Reminded mother of early feeding cues and that breast fed infants should be fed on demand without time restriction on the first breast until the infant seems satisfied. Then the second breast is offered. Advised mother to awaken  to feed if three hours have passed since baby last ate. Will continue to monitor mother's progress with breastfeeding and offer assistance at any time. Chart shows numerous feedings, void, stool WNL. Discussed importance of monitoring outputs and feedings on first week of life. Discussed ways to tell if baby is  getting enough breast milk, ie  voids and stools, change in color of stool, and return to birth wt within 2 weeks. Follow up with pediatrician visit for weight check in 1-2 days (per AAP guidelines.)  Encouraged to call [facility]  [phone] or [facility] at [phone] for any questions/problems that arise. Mother also given breastfeeding support group dates and times for any future needs Engorgement Care Guidelines:  Reviewed how milk is made and normal phases of milk production. Taught care of engorged breasts - frequent breastfeeding encouraged, cool packs and motrin as tolerated. Anticipatory guidance shared. Pt will successfully establish breastfeeding by feeding in response to early feeding cues  
or wake every 3h, will obtain deep latch, and will keep log of feedings/output. Taught to BF at hunger cues and or q 2-3 hrs and to offer 10-20 drops of hand expressed colostrum at any non-feeds. Breast Assessment Left Breast: Large Left Nipple: Everted, Intact Right Breast: Large Right Nipple: Everted, Intact Breast- Feeding Assessment Attends Breast-Feeding Classes: No 
Breast-Feeding Experience: Yes (2 weeks) Breast Trauma/Surgery: No 
Type/Quality: Good Lactation Consultant Visits Breast-Feedings: Good Mother/Infant Observation Mother Observation: Breast comfortable, Holds breast, Lets baby end feeding, Nipple round on release, Recognizes feeding cues Infant Observation: Rhythmic suck, Relaxed after feeding, Opens mouth, Lips flanged, upper, Lips flanged, lower, Latches nipple and aereolae, Feeding cues, Audible swallows LATCH Documentation Latch: Grasps breast, tongue down, lips flanged, rhythmic sucking Audible Swallowing: Spontaneous and intermittent (24 hours old) Type of Nipple: Everted (after stimulation) Comfort (Breast/Nipple): Soft/non-tender Hold (Positioning): Full assist, teach one side, mother does other, staff holds LATCH Score: 9

## 2018-10-12 ENCOUNTER — TELEPHONE (OUTPATIENT)
Dept: OBGYN CLINIC | Age: 38
End: 2018-10-12

## 2018-10-12 NOTE — TELEPHONE ENCOUNTER
- 37yr old patient of Dr. Shamika Raines recently delivered vaginally on 18. If c/o vaginal odor. Unsure if having any d/c, since she is still spotting since delivery. I advised patient I will see with physician her recommendation and call her back, she voiced understanding.

## 2018-11-01 ENCOUNTER — OFFICE VISIT (OUTPATIENT)
Dept: OBGYN CLINIC | Age: 38
End: 2018-11-01

## 2018-11-01 VITALS
HEIGHT: 65 IN | DIASTOLIC BLOOD PRESSURE: 64 MMHG | BODY MASS INDEX: 28.59 KG/M2 | WEIGHT: 171.6 LBS | SYSTOLIC BLOOD PRESSURE: 112 MMHG

## 2018-11-01 NOTE — PROGRESS NOTES
Postpartum evaluation    Merline Edelson is a 42 y.o. female who presents for a postpartum exam.     She is now six weeks post normal spontaneous vaginal delivery. Her baby is doing well. She has had no menses since delivery. She has had the following significant problems since her delivery: none    The patient is breast feeding without difficulty. The patient would like to discuss birth control. She is currently taking: vitamin, levothyroxine    She is due for her next AE in 3 months.      Visit Vitals  /64   Ht 5' 5\" (1.651 m)   Wt 171 lb 9.6 oz (77.8 kg)   BMI 28.56 kg/m²       PHYSICAL EXAMINATION    Constitutional  · Appearance: well-nourished, well developed, alert, in no acute distress    HENT  · Head and Face: appears normal    Neck  · Inspection/Palpation: normal appearance, no masses or tenderness  · Lymph Nodes: no lymphadenopathy present  · Thyroid: gland size normal, nontender, no nodules or masses present on palpation    Breasts  · Inspection of Breasts: breasts symmetrical, no skin changes, no discharge present, nipple appearance normal, no skin retraction present  · Palpation of Breasts and Axillae: no masses present on palpation, no breast tenderness  · Axillary Lymph Nodes: no lymphadenopathy present    Gastrointestinal  · Abdominal Examination: abdomen non-tender to palpation, normal bowel sounds, no masses present  · Liver and spleen: no hepatomegaly present, spleen not palpable  · Hernias: no hernias identified    Genitourinary  · External Genitalia: normal appearance for age, no discharge present, no tenderness present, no inflammatory lesions present, no masses present, no atrophy present  · Vagina: normal vaginal vault without central or paravaginal defects, no discharge present, no inflammatory lesions present, no masses present  · Bladder: non-tender to palpation  · Urethra: appears normal  · Cervix: normal   · Uterus: normal size, shape and consistency  · Adnexa: no adnexal tenderness present, no adnexal masses present  · Perineum: perineum within normal limits, no evidence of trauma, no rashes or skin lesions present  · Anus: anus within normal limits, no hemorrhoids present  · Inguinal Lymph Nodes: no lymphadenopathy present    Skin  · General Inspection: no rash, no lesions identified    Neurologic/Psychiatric  · Mental Status:  · Orientation: grossly oriented to person, place and time  · Mood and Affect: mood normal, affect appropriate    Assessment:  Normal postpartum check    Plan:  RTO for AE.   Desires Mirena

## 2018-11-01 NOTE — PATIENT INSTRUCTIONS
Nutrition for Breastfeeding Mothers: Care Instructions  Your Care Instructions    If you are breastfeeding, your doctor may suggest that you eat more calories each day than otherwise recommended for a person of your height and weight. Breastfeeding helps build the bond between you and your baby. It gives your baby excellent health benefits. A healthy diet includes eating a variety of foods from the basic food groups: grains, vegetables, fruits, milk and milk products (such as cheese and yogurt), and meat and dried beans. Eating well during breastfeeding will ensure that you stay healthy. Follow-up care is a key part of your treatment and safety. Be sure to make and go to all appointments, and call your doctor if you are having problems. It's also a good idea to know your test results and keep a list of the medicines you take. How can you care for yourself at home? · Include 3 to 4 cups of nonfat or low-fat milk or milk products in your diet every day. These include:  ? Milk (8 ounces equals 1 cup). ? Ice cream (1½ cups equals 1 cup of milk). ? Cheese (1½ ounces of cheese equals 1 cup). ? Yogurt (8 ounces equals 1 cup). · Eat at least 7 ounces of grains, such as cereals, breads, crackers, rice, or pasta, every day. One ounce is about 1 slice of bread, 1 cup of breakfast cereal, or ½ cup of cooked rice, cereal, or pasta. · Eat 3 cups of vegetables each day. Choices include:  ? Dark-green vegetables such as broccoli and spinach. ? Orange vegetables such as carrots and sweet potatoes. ? Dried beans (such as mcbride and kidney beans) and peas (such as lentils). · Every day, eat 2 cups of fresh, frozen, or canned fruit. · Eat 6½ ounces each day of protein, such as chicken, fish, lean meat, eggs, peanut butter, dried beans and peas, nuts, and seeds. One egg, 1 tablespoon of peanut butter, or ½ ounce of nuts or seeds equals 1 ounce of protein. A ½ cup of cooked beans equals 2 ounces of protein.   · Drink plenty of fluids, enough so that your urine is light yellow or clear like water. If you have kidney, heart, or liver disease and have to limit fluids, talk with your doctor before you increase the amount of fluids you drink. · Limit caffeine products, such as coffee, tea, chocolate, and some sodas. Caffeine can pass to your baby through breast milk. It may cause fussiness and sleep problems in babies. · Your doctor may recommend a vitamin supplement. Take it as recommended. · Consider joining a breastfeeding support group. These are offered at many hospitals and birthing centers by nurses, nurse-midwives, or lactation consultants. When should you call for help? Watch closely for changes in your health, and be sure to contact your doctor if you have any problems. Where can you learn more? Go to http://jaylnee-lor.info/. Enter P234 in the search box to learn more about \"Nutrition for Breastfeeding Mothers: Care Instructions. \"  Current as of: November 21, 2017  Content Version: 11.8  © 6277-0352 Healthwise, Incorporated. Care instructions adapted under license by [employer] (which disclaims liability or warranty for this information). If you have questions about a medical condition or this instruction, always ask your healthcare professional. [address] any warranty or liability for your use of this information.

## 2018-12-07 ENCOUNTER — OFFICE VISIT (OUTPATIENT)
Dept: OBGYN CLINIC | Age: 38
End: 2018-12-07

## 2018-12-07 VITALS
HEIGHT: 65 IN | WEIGHT: 171 LBS | SYSTOLIC BLOOD PRESSURE: 118 MMHG | DIASTOLIC BLOOD PRESSURE: 70 MMHG | BODY MASS INDEX: 28.49 KG/M2

## 2018-12-07 DIAGNOSIS — Z30.430 ENCOUNTER FOR INSERTION OF MIRENA IUD: Primary | ICD-10-CM

## 2018-12-07 DIAGNOSIS — N92.0 MENORRHAGIA WITH REGULAR CYCLE: ICD-10-CM

## 2018-12-07 LAB
HCG URINE, QL. (POC): NEGATIVE
VALID INTERNAL CONTROL?: YES

## 2018-12-07 NOTE — PATIENT INSTRUCTIONS
Intrauterine Device (IUD) Insertion: Care Instructions Your Care Instructions The intrauterine device (IUD) is a very effective method of birth control. It is a small, plastic, T-shaped device that contains copper or hormones. The doctor inserts the IUD into your uterus. A plastic string tied to the end of the IUD hangs down through the cervix into the vagina. There are two types of IUDs. The copper IUD is effective for up to 10 years. The hormonal IUD is effective for either 3 years or 5 years, depending on which IUD is used. The hormonal IUD also reduces menstrual bleeding and cramping. Both types of IUD damage or kill the man's sperm. This means that the woman's egg does not join with the sperm. IUDs also change the lining of the uterus so that the egg does not lodge there. The IUD is most likely to work well for women who have been pregnant before. Some women who have never been pregnant have more trouble keeping the IUD in the uterus. They also may have more pain and cramping after insertion. Follow-up care is a key part of your treatment and safety. Be sure to make and go to all appointments, and call your doctor if you are having problems. It's also a good idea to know your test results and keep a list of the medicines you take. How can you care for yourself at home? · You may experience some mild cramping and light bleeding (spotting) for 1 or 2 days. Use a hot water bottle or a heating pad set on low on your belly for pain. · Take an over-the-counter pain medicine, such as acetaminophen (Tylenol), ibuprofen (Advil, Motrin), and naproxen (Aleve) if needed. Read and follow all instructions on the label. · Do not take two or more pain medicines at the same time unless the doctor told you to. Many pain medicines have acetaminophen, which is Tylenol. Too much acetaminophen (Tylenol) can be harmful. · Check the string of your IUD after every period.  To do this, insert a finger into your vagina and feel for the cervix, which is at the top of the vagina and feels harder than the rest of your vagina. You should be able to feel the thin, plastic string coming out of the opening of your cervix. If you cannot feel the string, use another form of birth control and make an appointment with your doctor to have the string checked. · If the IUD comes out, save it and call your doctor. Be sure to use another form of birth control while the IUD is out. · Use latex condoms to protect against sexually transmitted infections (STIs), such as gonorrhea and chlamydia. An IUD does not protect you from STIs. Having one sex partner (who does not have STIs and does not have sex with anyone else) is a good way to avoid STIs. When should you call for help? Call 911 anytime you think you may need emergency care. For example, call if: 
  · You passed out (lost consciousness).  
  · You have sudden, severe pain in your belly or pelvis.  
 Call your doctor now or seek immediate medical care if: 
  · You have new belly or pelvic pain.  
  · You have severe vaginal bleeding. This means that you are soaking through your usual pads or tampons each hour for 2 or more hours.  
  · You are dizzy or lightheaded, or you feel like you may faint.  
  · You have a fever and pelvic pain or vaginal discharge.  
  · You have pelvic pain that is getting worse.  
 Watch closely for changes in your health, and be sure to contact your doctor if: 
  · You cannot feel the string, or the IUD comes out.  
  · You feel sick to your stomach, or you vomit.  
  · You think you may be pregnant. Where can you learn more? Go to http://jaylene-lor.info/. Enter O459 in the search box to learn more about \"Intrauterine Device (IUD) Insertion: Care Instructions. \" Current as of: November 21, 2017 Content Version: 11.8 © 3935-3888 Healthwise, Incorporated.  Care instructions adapted under license by [address] (which disclaims liability or warranty for this information). If you have questions about a medical condition or this instruction, always ask your healthcare professional. [address] any warranty or liability for your use of this information.

## 2018-12-07 NOTE — PROGRESS NOTES
[facility] PROCEDURE PROGRESS NOTE Chart reviewed for the following: 
 KENDRA April Gosia, have reviewed the History, Physical and updated the Allergic reactions for Britt Posadas TIME OUT performed immediately prior to start of procedure: 
 KENDRA April Gosia, have performed the following reviews on Britt Posadas prior to the start of the procedure: 
         
* Patient was identified by name and date of birth * Agreement on procedure being performed was verified * Risks and Benefits explained to the patient * Procedure site verified and marked as necessary * Patient was positioned for comfort * Consent was signed and verified Time: 10:05am 
 
 
Date of procedure: 2018 Procedure performed by:  Zabrina Perea MD 
 
Patient assisted by: self How tolerated by patient: tolerated the procedure well with no complications Post Procedural Pain Scale: 2 - Hurts Little Bit Comments: none Mirena IUD INSERTION Indications: 
Britt Posadas is a ,  34 y.o. female OTHER Patient's last menstrual period was 2018 (exact date). Her LMP was normal in duration and amount of flow. She  presents for insertion of an IUD. The risks, benefits and alternatives of IUD insertion were discussed in detail at last visit. She also has reviewed Mirena information. She has elected to proceed with the insertion today and she states she has no further questions. A urine pregnancy test was negative Procedure: The pelvic exam revealed normal external genitalia. On bimanual exam the uterus was anteverted and normal in size with no tenderness present. A speculum was inserted into the vagina and the cervix was visualized. The cervix was prepped with zephiran solution. The anterior lip of the cervix was grasped with a single toothed tenaculum. The uterus was sounded with a Christianson sound to 8 centimeters.  A Mirena was then inserted without difficulty. The string was cut to 3 centimeters. She experienced a mild  amount of cramping. Post Procedure Status: She tolerated the procedure with mild discomfort. The patient was observed for 10 minutes after the insertion. There were no complications. Patient was discharged in stable condition. The patient received Mirena lot number [id number]. Disc bleeding, infection, expulsion US in 4 weeks

## 2019-01-03 ENCOUNTER — TELEPHONE (OUTPATIENT)
Dept: OBGYN CLINIC | Age: 39
End: 2019-01-03

## 2019-01-03 NOTE — TELEPHONE ENCOUNTER
Patient calling stating that she had a Mirena inserted on 12/7/18 and reports that she is still bleeding from the insertion. Patient states she has cramping but mild in nature and passing tiny clots. Patient is not soaking through a pad in 1 hour but states she is changing her pads often since something is on them. Patient advised that she will have light bleeding that may start/stop as her body is adjusting to the IUD. Patient given S&S of what to expect if the IUD shifts or move and when to contact the office. Patient encouraged to keep IUD check US appointment that is scheduled for 1/8/19. Dr. Earl De Luna - advise if agree with recs.

## 2019-01-08 ENCOUNTER — TELEPHONE (OUTPATIENT)
Dept: OBGYN CLINIC | Age: 39
End: 2019-01-08

## 2019-01-08 DIAGNOSIS — Z30.431 IUD CHECK UP: Primary | ICD-10-CM

## 2019-01-08 NOTE — TELEPHONE ENCOUNTER
Orders placed and faxed. Left message on patient's home number to notify she may make appointment when needed.

## 2019-01-08 NOTE — TELEPHONE ENCOUNTER
Patient is requesting an order to be placed for her to go to [address] [place] [address] to have her IUD placement check ultrasound. She said that every time she comes to our office, her insurance is charged like a hospital service and it will cost her a lot more money than an independent radiology center.       Patient had the Mirena IUD placed on 12/7/18    Fax to [facility]

## 2019-01-22 DIAGNOSIS — Z30.431 IUD CHECK UP: ICD-10-CM

## 2019-03-03 ENCOUNTER — DOCUMENTATION ONLY (OUTPATIENT)
Dept: OBGYN CLINIC | Age: 39
End: 2019-03-03

## 2019-03-18 ENCOUNTER — OFFICE VISIT (OUTPATIENT)
Dept: OBGYN CLINIC | Age: 39
End: 2019-03-18

## 2019-03-18 VITALS
SYSTOLIC BLOOD PRESSURE: 118 MMHG | BODY MASS INDEX: 30.09 KG/M2 | HEIGHT: 65 IN | DIASTOLIC BLOOD PRESSURE: 70 MMHG | WEIGHT: 180.6 LBS

## 2019-03-18 DIAGNOSIS — Z01.419 ENCOUNTER FOR GYNECOLOGICAL EXAMINATION WITHOUT ABNORMAL FINDING: Primary | ICD-10-CM

## 2019-03-18 RX ORDER — LEVOTHYROXINE SODIUM 175 UG/1
TABLET ORAL
Refills: 1 | COMMUNITY
Start: 2019-01-16

## 2019-03-18 NOTE — PROGRESS NOTES
Shahbaz Colon is a ,  34 y.o. female OTHER whose LMP was on 3/13/2019 who presents for her annual checkup. She is having significant irregular spotting since Mirena was placed 2018. Patient states she will only give it one more month and then she wants it removed. Had normal ultrasound at [facility]    Menstrual status:    Her periods are light in flow. She is using three to five pads or tampons per day, almost everyday x 1 year since Mirena was placed. She denies dysmenorrhea. She reports no premenstrual symptoms. The patient is not using HRT. Contraception:    The current method of family planning is IUD. Sexual history:    She  reports that she currently engages in sexual activity and has had partners who are Male. She reports using the following method of birth control/protection: IUD. Medical conditions:    Since her last annual GYN exam about one year ago, she has had the following changes in her health history: none. Pap and Mammogram History:    Her most recent Pap smear was normal, HPV neg obtained 3/16/2018. The patient had her mammogram today in our office. IMPRESSION:  BI-RADS 1: Negative. No mammographic evidence of malignancy.     RECOMMENDATIONS:  Next screening mammogram is recommended in one year.      The patient will be notified of these results. Breast Cancer History/Substance Abuse:    She has no family history of breast cancer. Osteoporosis History:    Family history does not include a first or second degree relative with osteopenia or osteoporosis. She is not  currently taking calcium and vit D.       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 Current Outpatient Medications   Medication Sig Dispense Refill    LEVOXYL 175 mcg tablet TK 1 T PO QD  1    PNV BJ.17/JRYLMYO fum/folic ac (PRENATAL PO) Take  by mouth.  levothyroxine (LEVOXYL) 112 mcg tablet Take 125 mcg by mouth Daily (before breakfast). Allergies: Patient has no known allergies. Social History     Socioeconomic History    Marital status:      Spouse name: Not on file    Number of children: Not on file    Years of education: Not on file    Highest education level: Not on file   Social Needs    Financial resource strain: Not on file    Food insecurity - worry: Not on file    Food insecurity - inability: Not on file    Transportation needs - medical: Not on file   [employer] needs - non-medical: Not on file   Occupational History    Not on file   Tobacco Use    Smoking status: Never Smoker    Smokeless tobacco: Never Used   Substance and Sexual Activity    Alcohol use: No    Drug use: No    Sexual activity: Yes     Partners: Male     Birth control/protection: IUD   Other Topics Concern    Not on file   Social History Narrative    Not on file     Tobacco History:  reports that  has never smoked. she has never used smokeless tobacco.  Alcohol Abuse:  reports that she does not drink alcohol. Drug Abuse:  reports that she does not use drugs.   Patient Active Problem List   Diagnosis Code    Reflux K21.9    Constipation 564.0    Sinusitis J32.9    Morbid obesity ([facility] [address] E66.01    Polycystic ovarian syndrome E28.2    Infertility female     Urinary incontinence R32    Heartburn R12    SOB (shortness of breath) on exertion R06.02    Hypothyroidism E03.9    Low back pain M54.5    Pregnancy Z34.90    Active labor [id number]    Snoring R06.83    Dizziness R42    Fatigue R53.83    Supervision of elderly multigravida in second trimester O09.522         Review of Systems - History obtained from the patient  Constitutional: negative for weight loss, fever, night sweats  HEENT: negative for hearing loss, earache, congestion, snoring, sorethroat  CV: negative for chest pain, palpitations, edema  Resp: negative for cough, shortness of breath, wheezing  GI: negative for change in bowel habits, abdominal pain, black or bloody stools  : negative for frequency, dysuria, hematuria, vaginal discharge  MSK: negative for back pain, joint pain, muscle pain  Breast: negative for breast lumps, nipple discharge, galactorrhea  Skin :negative for itching, rash, hives  Neuro: negative for dizziness, headache, confusion, weakness  Psych: negative for anxiety, depression, change in mood  Heme/lymph: negative for bleeding, bruising, pallor    Physical Exam    Visit Vitals  /70 (BP 1 Location: Left arm, BP Patient Position: Sitting)   Ht 5' 5\" (1.651 m)   Wt 180 lb 9.6 oz (81.9 kg)   LMP 03/13/2019 (Approximate)   Breastfeeding?  No   BMI 30.05 kg/m²     Constitutional  · Appearance: well-nourished, well developed, alert, in no acute distress    HENT  · Head and Face: appears normal    Neck  · Inspection/Palpation: normal appearance, no masses or tenderness  · Lymph Nodes: no lymphadenopathy present  · Thyroid: gland size normal, nontender, no nodules or masses present on palpation    Chest  · Respiratory Effort: breathing normal  · Auscultation: normal breath sounds    Cardiovascular  · Heart:  · Auscultation: regular rate and rhythm without murmur    Breasts  · Inspection of Breasts: breasts symmetrical, no skin changes, no discharge present, nipple appearance normal, no skin retraction present  · Palpation of Breasts and Axillae: no masses present on palpation, no breast tenderness  · Axillary Lymph Nodes: no lymphadenopathy present    Gastrointestinal  · Abdominal Examination: abdomen non-tender to palpation, normal bowel sounds, no masses present  · Liver and spleen: no hepatomegaly present, spleen not palpable  · Hernias: no hernias identified    Skin  · General Inspection: no rash, no lesions identified    Neurologic/Psychiatric  · Mental Status:  · Orientation: grossly oriented to person, place and time  · Mood and Affect: mood normal, affect appropriate    Genitourinary  · External Genitalia: normal appearance for age, no discharge present, no tenderness present, no inflammatory lesions present, no masses present, no atrophy present  · Vagina: normal vaginal vault without central or paravaginal defects, no discharge present, no inflammatory lesions present, no masses present  · Bladder: non-tender to palpation  · Urethra: appears normal  · Cervix: normal, IUD   · Uterus: normal size, shape and consistency  · Adnexa: no adnexal tenderness present, no adnexal masses present  · Perineum: perineum within normal limits, no evidence of trauma, no rashes or skin lesions present  · Anus: anus within normal limits, no hemorrhoids present  · Inguinal Lymph Nodes: no lymphadenopathy present    Assessment:  Routine gynecologic examination  Her current medical status is satisfactory with no evidence of significant gynecologic issues.     Plan:  Counseled re: diet, exercise, healthy lifestyle  Return for yearly wellness visits  Rec annual mammogram  If BTB cont at 6th month needs US

## 2019-03-18 NOTE — PATIENT INSTRUCTIONS

## 2020-02-22 ENCOUNTER — APPOINTMENT (OUTPATIENT)
Dept: GENERAL RADIOLOGY | Age: 40
End: 2020-02-22
Attending: STUDENT IN AN ORGANIZED HEALTH CARE EDUCATION/TRAINING PROGRAM
Payer: COMMERCIAL

## 2020-02-22 ENCOUNTER — HOSPITAL ENCOUNTER (EMERGENCY)
Age: 40
Discharge: HOME | End: 2020-02-22
Attending: STUDENT IN AN ORGANIZED HEALTH CARE EDUCATION/TRAINING PROGRAM
Payer: COMMERCIAL

## 2020-02-22 VITALS
WEIGHT: 180 LBS | RESPIRATION RATE: 16 BRPM | OXYGEN SATURATION: 98 % | SYSTOLIC BLOOD PRESSURE: 119 MMHG | TEMPERATURE: 99.2 F | BODY MASS INDEX: 30.73 KG/M2 | HEART RATE: 92 BPM | DIASTOLIC BLOOD PRESSURE: 62 MMHG | HEIGHT: 64 IN

## 2020-02-22 DIAGNOSIS — M54.6 ACUTE LEFT-SIDED THORACIC BACK PAIN: ICD-10-CM

## 2020-02-22 DIAGNOSIS — R07.81 PLEURITIC PAIN: Primary | ICD-10-CM

## 2020-02-22 LAB
ALBUMIN SERPL-MCNC: 3.4 G/DL (ref 3.5–5)
ALBUMIN/GLOB SERPL: 0.8 {RATIO} (ref 1.1–2.2)
ALP SERPL-CCNC: 82 U/L (ref 45–117)
ALT SERPL-CCNC: 62 U/L (ref 12–78)
ANION GAP SERPL CALC-SCNC: 7 MMOL/L (ref 5–15)
APPEARANCE UR: CLEAR
AST SERPL-CCNC: 29 U/L (ref 15–37)
BACTERIA URNS QL MICRO: ABNORMAL /HPF
BASOPHILS # BLD: 0 K/UL (ref 0–0.1)
BASOPHILS NFR BLD: 0 % (ref 0–1)
BILIRUB SERPL-MCNC: 0.5 MG/DL (ref 0.2–1)
BILIRUB UR QL: NEGATIVE
BUN SERPL-MCNC: 10 MG/DL (ref 6–20)
BUN/CREAT SERPL: 14 (ref 12–20)
CALCIUM SERPL-MCNC: 9.2 MG/DL (ref 8.5–10.1)
CHLORIDE SERPL-SCNC: 106 MMOL/L (ref 97–108)
CO2 SERPL-SCNC: 28 MMOL/L (ref 21–32)
COLOR UR: ABNORMAL
CREAT SERPL-MCNC: 0.71 MG/DL (ref 0.55–1.02)
D DIMER PPP FEU-MCNC: 0.54 MG/L FEU (ref 0–0.65)
DIFFERENTIAL METHOD BLD: ABNORMAL
EOSINOPHIL # BLD: 0.5 K/UL (ref 0–0.4)
EOSINOPHIL NFR BLD: 5 % (ref 0–7)
EPITH CASTS URNS QL MICRO: ABNORMAL /LPF
ERYTHROCYTE [DISTWIDTH] IN BLOOD BY AUTOMATED COUNT: 12.3 % (ref 11.5–14.5)
GLOBULIN SER CALC-MCNC: 4.1 G/DL (ref 2–4)
GLUCOSE SERPL-MCNC: 84 MG/DL (ref 65–100)
GLUCOSE UR STRIP.AUTO-MCNC: NEGATIVE MG/DL
HCG UR QL: NEGATIVE
HCT VFR BLD AUTO: 42.6 % (ref 35–47)
HGB BLD-MCNC: 13.5 G/DL (ref 11.5–16)
HGB UR QL STRIP: ABNORMAL
KETONES UR QL STRIP.AUTO: NEGATIVE MG/DL
LEUKOCYTE ESTERASE UR QL STRIP.AUTO: ABNORMAL
LYMPHOCYTES # BLD: 1.5 K/UL (ref 0.8–3.5)
LYMPHOCYTES NFR BLD: 14 % (ref 12–49)
MCH RBC QN AUTO: 28.6 PG (ref 26–34)
MCHC RBC AUTO-ENTMCNC: 31.7 G/DL (ref 30–36.5)
MCV RBC AUTO: 90.3 FL (ref 80–99)
MONOCYTES # BLD: 1.1 K/UL (ref 0–1)
MONOCYTES NFR BLD: 10 % (ref 5–13)
NEUTS SEG # BLD: 7.2 K/UL (ref 1.8–8)
NEUTS SEG NFR BLD: 71 % (ref 32–75)
NITRITE UR QL STRIP.AUTO: NEGATIVE
PH UR STRIP: 7 [PH] (ref 5–8)
PLATELET # BLD AUTO: 213 K/UL (ref 150–400)
PMV BLD AUTO: 11.6 FL (ref 8.9–12.9)
POTASSIUM SERPL-SCNC: 4.4 MMOL/L (ref 3.5–5.1)
PROT SERPL-MCNC: 7.5 G/DL (ref 6.4–8.2)
PROT UR STRIP-MCNC: NEGATIVE MG/DL
RBC # BLD AUTO: 4.72 M/UL (ref 3.8–5.2)
RBC #/AREA URNS HPF: ABNORMAL /HPF (ref 0–5)
SODIUM SERPL-SCNC: 141 MMOL/L (ref 136–145)
SP GR UR REFRACTOMETRY: 1.02 (ref 1–1.03)
TROPONIN I SERPL-MCNC: <0.05 NG/ML
UR CULT HOLD, URHOLD: NORMAL
UROBILINOGEN UR QL STRIP.AUTO: 0.2 EU/DL (ref 0.2–1)
WBC # BLD AUTO: 10.3 K/UL (ref 3.6–11)
WBC URNS QL MICRO: ABNORMAL /HPF (ref 0–4)

## 2020-02-22 PROCEDURE — 36415 COLL VENOUS BLD VENIPUNCTURE: CPT

## 2020-02-22 PROCEDURE — 80053 COMPREHEN METABOLIC PANEL: CPT

## 2020-02-22 PROCEDURE — 85379 FIBRIN DEGRADATION QUANT: CPT

## 2020-02-22 PROCEDURE — 74011250636 HC RX REV CODE- 250/636: Performed by: STUDENT IN AN ORGANIZED HEALTH CARE EDUCATION/TRAINING PROGRAM

## 2020-02-22 PROCEDURE — 84484 ASSAY OF TROPONIN QUANT: CPT

## 2020-02-22 PROCEDURE — 81001 URINALYSIS AUTO W/SCOPE: CPT

## 2020-02-22 PROCEDURE — 96374 THER/PROPH/DIAG INJ IV PUSH: CPT

## 2020-02-22 PROCEDURE — 85025 COMPLETE CBC W/AUTO DIFF WBC: CPT

## 2020-02-22 PROCEDURE — 81025 URINE PREGNANCY TEST: CPT

## 2020-02-22 PROCEDURE — 99284 EMERGENCY DEPT VISIT MOD MDM: CPT

## 2020-02-22 PROCEDURE — 71046 X-RAY EXAM CHEST 2 VIEWS: CPT

## 2020-02-22 PROCEDURE — 93005 ELECTROCARDIOGRAM TRACING: CPT

## 2020-02-22 RX ORDER — KETOROLAC TROMETHAMINE 30 MG/ML
30 INJECTION, SOLUTION INTRAMUSCULAR; INTRAVENOUS
Status: COMPLETED | OUTPATIENT
Start: 2020-02-22 | End: 2020-02-22

## 2020-02-22 RX ORDER — NAPROXEN 500 MG/1
500 TABLET ORAL 2 TIMES DAILY WITH MEALS
Qty: 20 TAB | Refills: 0 | Status: SHIPPED | OUTPATIENT
Start: 2020-02-22 | End: 2020-08-25

## 2020-02-22 RX ORDER — CYCLOBENZAPRINE HCL 10 MG
10 TABLET ORAL
Qty: 15 TAB | Refills: 0 | Status: SHIPPED | OUTPATIENT
Start: 2020-02-22 | End: 2020-02-29

## 2020-02-22 RX ADMIN — KETOROLAC TROMETHAMINE 30 MG: 30 INJECTION, SOLUTION INTRAMUSCULAR at 13:17

## 2020-02-22 NOTE — ED PROVIDER NOTES
Solo De Los Santos is a 37 y.o. female with past medical history notable for anxiety, GERD, obesity, sinusitis, varicose veins presenting with pleuritic left-sided posterior thoracic pain. Onset 2 days ago. Associated with cough, denies hemoptysis, or productive cough. Was having coughing yesterday which preceded her symptoms. Denies leg swelling or leg pain, recent travel. Does have a hormonal IUD. Past Medical History:   Diagnosis Date    Abnormal Pap smear of cervix 2016    Neg pap +HPV     history     Acquired hypothyroidism     hyperthyroid, graves disease    Anxiety     recent diagnosis    Constipation     Encounter for insertion of mirena IUD 2018    Mirena placed    Frequent urination     GERD (gastroesophageal reflux disease)     HSV-2 infection 2014    HX OTHER MEDICAL     compression    Infertility     clomid    Leg pain     Morbid obesity ([address]     PCOS (polycystic ovarian syndrome)     Rapid pulse     Reflux     Sinusitis     Snoring     Thyroid disease     Varicose vein        Past Surgical History:   Procedure Laterality Date    GASTRIC BYPASS,OBESE<150CM MELIZA-EN-Y      HX GASTRIC BYPASS  10/2/13    HX HEENT  orbital surgery    orbital decompression.           Family History:   Problem Relation Age of Onset    Hypertension Mother     Diabetes Mother     High Cholesterol Mother     Hypertension Father     Diabetes Father     Heart Disease Father     Stroke Father     Hypertension Maternal Grandmother     Diabetes Maternal Grandmother     Hypertension Maternal Grandfather     Diabetes Maternal Grandfather     Stroke Maternal Grandfather     Hypertension Paternal Grandmother     Diabetes Paternal Grandmother     Stroke Paternal Grandmother     Hypertension Paternal Grandfather     Diabetes Paternal Grandfather        Social History     Socioeconomic History    Marital status:      Spouse name: Not on file    Number of children: Not on file    Years of education: Not on file    Highest education level: Not on file   Occupational History    Not on file   Social Needs    Financial resource strain: Not on file    Food insecurity:     Worry: Not on file     Inability: Not on file    Transportation needs:     Medical: Not on file     Non-medical: Not on file   Tobacco Use    Smoking status: Never Smoker    Smokeless tobacco: Never Used   Substance and Sexual Activity    Alcohol use: No    Drug use: No    Sexual activity: Yes     Partners: Male     Birth control/protection: I.U.D. Lifestyle    Physical activity:     Days per week: Not on file     Minutes per session: Not on file    Stress: Not on file   Relationships    Social connections:     Talks on phone: Not on file     Gets together: Not on file     Attends [employer] service: Not on file     Active member of club or organization: Not on file     Attends meetings of clubs or organizations: Not on file     Relationship status: Not on file    Intimate partner violence:     Fear of current or ex partner: Not on file     Emotionally abused: Not on file     Physically abused: Not on file     Forced sexual activity: Not on file   Other Topics Concern    Not on file   Social History Narrative    Not on file         ALLERGIES: Patient has no known allergies. Review of Systems   Constitutional: Negative for chills and fever. Eyes: Negative for photophobia. Respiratory: Positive for cough. Negative for shortness of breath. Cardiovascular: Negative for chest pain and leg swelling. Gastrointestinal: Negative for abdominal pain. Genitourinary: Negative for dysuria. Musculoskeletal: Negative for back pain. Neurological: Negative for light-headedness and headaches. Psychiatric/Behavioral: Negative for confusion. All other systems reviewed and are negative.       Vitals:    02/22/20 1230   BP: 133/80   Pulse: 87   Resp: 16   Temp: 99.2 °F (37.3 °C)   SpO2: 100%   Weight: 81.6 kg (180 lb)   Height: 5' 4\" (1.626 m)            Physical Exam  Vitals signs reviewed. Constitutional:       General: She is not in acute distress. HENT:      Head: Normocephalic and atraumatic. Mouth/Throat:      Mouth: Mucous membranes are moist.      Pharynx: Oropharynx is clear. Eyes:      Pupils: Pupils are equal, round, and reactive to light. Cardiovascular:      Rate and Rhythm: Normal rate and regular rhythm. Heart sounds: Normal heart sounds. Pulmonary:      Effort: Pulmonary effort is normal.      Breath sounds: Normal breath sounds. Abdominal:      General: There is no distension. Tenderness: There is no abdominal tenderness. There is no guarding or rebound. Musculoskeletal: Normal range of motion. Back:    Skin:     General: Skin is warm and dry. Capillary Refill: Capillary refill takes less than 2 seconds. Neurological:      General: No focal deficit present. Mental Status: She is alert and oriented to person, place, and time. Cranial Nerves: No cranial nerve deficit. Sensory: No sensory deficit. Motor: No weakness. Coordination: Coordination normal.   Psychiatric:         Mood and Affect: Mood normal.         Behavior: Behavior normal.          MDM  Number of Diagnoses or Management Options  Acute left-sided thoracic back pain:   Pleuritic pain:   Diagnosis management comments: Farzad Goodwin is a 37 y.o. female presenting with left-sided posterior thoracic pain. Differential includes pleurisy, muscle spasm, PE, pneumonia. Course: Check labs, including d-dimer. Toradol for pain. Will reassess. 12:50 PM .  Dispo: Discharge-patient feeling better after Toradol, d-dimer negative. Remainder of labs and x-ray unremarkable. Likely musculoskeletal strain. Procedures      EK  Normal sinus rhythm, ventricular rate 87, normal axis, no ST-T wave deviation. No signs of PE. Right heart strain.

## 2020-02-22 NOTE — ED TRIAGE NOTES
Pt reports that she started with left middle back pain two days ago. The pt is worse with movement, deep breathing and coughing. Seen at PCP two weeks ago for cough and pt had a negative chest xray for cough. Pt denies urinary symptoms.

## 2020-02-23 LAB
ATRIAL RATE: 87 BPM
CALCULATED P AXIS, ECG09: 63 DEGREES
CALCULATED R AXIS, ECG10: 18 DEGREES
CALCULATED T AXIS, ECG11: 24 DEGREES
DIAGNOSIS, 93000: NORMAL
P-R INTERVAL, ECG05: 140 MS
Q-T INTERVAL, ECG07: 346 MS
QRS DURATION, ECG06: 76 MS
QTC CALCULATION (BEZET), ECG08: 416 MS
VENTRICULAR RATE, ECG03: 87 BPM

## 2020-07-10 ENCOUNTER — OFFICE VISIT (OUTPATIENT)
Dept: OBGYN CLINIC | Age: 40
End: 2020-07-10

## 2020-07-10 VITALS
WEIGHT: 191 LBS | HEIGHT: 65 IN | DIASTOLIC BLOOD PRESSURE: 85 MMHG | SYSTOLIC BLOOD PRESSURE: 131 MMHG | BODY MASS INDEX: 31.82 KG/M2

## 2020-07-10 DIAGNOSIS — Z01.419 ENCOUNTER FOR GYNECOLOGICAL EXAMINATION (GENERAL) (ROUTINE) WITHOUT ABNORMAL FINDINGS: Primary | ICD-10-CM

## 2020-07-10 NOTE — PROGRESS NOTES
Franky Lorenzo is a ,  37 y.o. female OTHER whose LMP was on 7/3/2020 who presents for her annual checkup. She is having no significant problems. Menstrual status:    Her periods are minimal to nonexistent in flow. She is using essentially none pads or tampons per day, minimal to none using Mirena. She denies dysmenorrhea. She reports no premenstrual symptoms. The patient is not using HRT. Contraception:    The current method of family planning is IUD. Sexual history:    She  reports being sexually active and has had partner(s) who are Male. She reports using the following method of birth control/protection: I.U.D..    Medical conditions:    Since her last annual GYN exam about 3/18/2019 ago, she has had the following changes in her health history: none. Pap and Mammogram History:    Her most recent Pap smear was normal/-HPV obtained 3/16/2018 year(s) ago. The patient had her mammogram today in our office. Breast Cancer History/Substance Abuse:    She has a family history of breast cancer. Osteoporosis History:    Family history does not include a first or second degree relative with osteopenia or osteoporosis. A bone density scan was not obtained. She is currently not taking calcium and vit D.       Past Medical History:   Diagnosis Date    Abnormal Pap smear of cervix 2016    Neg pap +HPV     history     Acquired hypothyroidism     hyperthyroid, graves disease    Anxiety     recent diagnosis    Constipation     Encounter for insertion of mirena IUD 2018    Mirena placed    Frequent urination     GERD (gastroesophageal reflux disease)     HSV-2 infection 2014    HX OTHER MEDICAL     compression    Infertility     clomid    Leg pain     Morbid obesity ([address]     PCOS (polycystic ovarian syndrome)     Rapid pulse     Reflux     Sinusitis     Snoring     Thyroid disease     Varicose vein      Past Surgical History:   Procedure Laterality Date    GASTRIC BYPASS,OBESE<150CM MELIZA-EN-Y      HX GASTRIC BYPASS  10/2/13    HX HEENT  orbital surgery    orbital decompression. Current Outpatient Medications   Medication Sig Dispense Refill    naproxen (NAPROSYN) 500 mg tablet Take 1 Tab by mouth two (2) times daily (with meals). 20 Tab 0    LEVOXYL 175 mcg tablet TK 1 T PO QD  1     Allergies: Patient has no known allergies. Social History     Socioeconomic History    Marital status:      Spouse name: Not on file    Number of children: Not on file    Years of education: Not on file    Highest education level: Not on file   Occupational History    Not on file   Social Needs    Financial resource strain: Not on file    Food insecurity     Worry: Not on file     Inability: Not on file    Transportation needs     Medical: Not on file     Non-medical: Not on file   Tobacco Use    Smoking status: Never Smoker    Smokeless tobacco: Never Used   Substance and Sexual Activity    Alcohol use: No    Drug use: No    Sexual activity: Yes     Partners: Male     Birth control/protection: I.U.D. Lifestyle    Physical activity     Days per week: Not on file     Minutes per session: Not on file    Stress: Not on file   Relationships    Social connections     Talks on phone: Not on file     Gets together: Not on file     Attends Baptism service: Not on file     Active member of club or organization: Not on file     Attends meetings of clubs or organizations: Not on file     Relationship status: Not on file    Intimate partner violence     Fear of current or ex partner: Not on file     Emotionally abused: Not on file     Physically abused: Not on file     Forced sexual activity: Not on file   Other Topics Concern    Not on file   Social History Narrative    Not on file     Tobacco History:  reports that she has never smoked. She has never used smokeless tobacco.  Alcohol Abuse:  reports no history of alcohol use.   Drug Abuse: reports no history of drug use.   Patient Active Problem List   Diagnosis Code    Reflux [id number]    Constipation 564.0    Sinusitis J32.9    Morbid obesity ([facility] E66.01    Polycystic ovarian syndrome E28.2    Infertility female     Urinary incontinence R32    Heartburn R12    SOB (shortness of breath) on exertion R06.02    Hypothyroidism E03.9    Low back pain M54.5    Pregnancy Z34.90    Active labor [id number]    Snoring R06.83    Dizziness R42    Fatigue R53.83    Supervision of elderly multigravida in second trimester O09.522         Review of Systems - History obtained from the patient  Constitutional: negative for weight loss, fever, night sweats  HEENT: negative for hearing loss, earache, congestion, snoring, sorethroat  CV: negative for chest pain, palpitations, edema  Resp: negative for cough, shortness of breath, wheezing  GI: negative for change in bowel habits, abdominal pain, black or bloody stools  : negative for frequency, dysuria, hematuria, vaginal discharge  MSK: negative for back pain, joint pain, muscle pain  Breast: negative for breast lumps, nipple discharge, galactorrhea  Skin :negative for itching, rash, hives  Neuro: negative for dizziness, headache, confusion, weakness  Psych: negative for anxiety, depression, change in mood  Heme/lymph: negative for bleeding, bruising, pallor    Physical Exam    Visit Vitals  /85   Ht 5' 5\" (1.651 m)   Wt 191 lb (86.6 kg)   LMP 07/03/2020   BMI 31.78 kg/m²     Constitutional  · Appearance: well-nourished, well developed, alert, in no acute distress    HENT  · Head and Face: appears normal    Neck  · Inspection/Palpation: normal appearance, no masses or tenderness  · Lymph Nodes: no lymphadenopathy present  · Thyroid: gland size normal, nontender, no nodules or masses present on palpation    Chest  · Respiratory Effort: breathing normal  · Auscultation: normal breath sounds    Cardiovascular  · Heart:  · Auscultation: regular rate and rhythm without murmur    Breasts  · Inspection of Breasts: breasts symmetrical, no skin changes, no discharge present, nipple appearance normal, no skin retraction present  · Palpation of Breasts and Axillae: no masses present on palpation, no breast tenderness  · Axillary Lymph Nodes: no lymphadenopathy present    Gastrointestinal  · Abdominal Examination: abdomen non-tender to palpation, normal bowel sounds, no masses present  · Liver and spleen: no hepatomegaly present, spleen not palpable  · Hernias: no hernias identified    Skin  · General Inspection: no rash, no lesions identified    Neurologic/Psychiatric  · Mental Status:  · Orientation: grossly oriented to person, place and time  · Mood and Affect: mood normal, affect appropriate    Genitourinary  · External Genitalia: normal appearance for age, no discharge present, no tenderness present, no inflammatory lesions present, no masses present, no atrophy present  · Vagina: normal vaginal vault without central or paravaginal defects, no discharge present, no inflammatory lesions present, no masses present  · Bladder: non-tender to palpation  · Urethra: appears normal  · Cervix: normal, string seen  · Uterus: normal size, shape and consistency  · Adnexa: no adnexal tenderness present, no adnexal masses present  · Perineum: perineum within normal limits, no evidence of trauma, no rashes or skin lesions present  · Anus: anus within normal limits, no hemorrhoids present  · Inguinal Lymph Nodes: no lymphadenopathy present    Assessment:  Routine gynecologic examination  Her current medical status is satisfactory with no evidence of significant gynecologic issues.     Plan:  Counseled re: diet, exercise, healthy lifestyle  Return for yearly wellness visits  Rec annual mammogram

## 2020-07-10 NOTE — PATIENT INSTRUCTIONS

## 2020-08-25 ENCOUNTER — OFFICE VISIT (OUTPATIENT)
Dept: SURGERY | Age: 40
End: 2020-08-25
Payer: COMMERCIAL

## 2020-08-25 VITALS
BODY MASS INDEX: 32.02 KG/M2 | HEART RATE: 68 BPM | RESPIRATION RATE: 18 BRPM | OXYGEN SATURATION: 98 % | WEIGHT: 192.2 LBS | SYSTOLIC BLOOD PRESSURE: 127 MMHG | TEMPERATURE: 98.3 F | DIASTOLIC BLOOD PRESSURE: 82 MMHG | HEIGHT: 65 IN

## 2020-08-25 DIAGNOSIS — R63.5 WEIGHT GAIN: ICD-10-CM

## 2020-08-25 DIAGNOSIS — E66.9 OBESITY (BMI 30.0-34.9): Primary | ICD-10-CM

## 2020-08-25 PROCEDURE — 99203 OFFICE O/P NEW LOW 30 MIN: CPT | Performed by: NURSE PRACTITIONER

## 2020-08-25 RX ORDER — TOPIRAMATE 25 MG/1
25 TABLET ORAL
Qty: 30 TAB | Refills: 1 | Status: SHIPPED | OUTPATIENT
Start: 2020-08-25 | End: 2020-11-05 | Stop reason: SDUPTHER

## 2020-08-25 RX ORDER — PHENTERMINE HYDROCHLORIDE 37.5 MG/1
TABLET ORAL
Qty: 30 TAB | Refills: 0 | Status: SHIPPED | OUTPATIENT
Start: 2020-08-25 | End: 2020-11-05 | Stop reason: SDUPTHER

## 2020-08-25 RX ORDER — BISMUTH SUBSALICYLATE 262 MG
1 TABLET,CHEWABLE ORAL DAILY
COMMUNITY

## 2020-08-25 NOTE — PROGRESS NOTES
1. Have you been to the ER, urgent care clinic since your last visit? Hospitalized since your last visit? No     2. Have you seen or consulted any other health care providers outside of the [address] [place] [address] since your last visit? Include any pap smears or colon screening.  No

## 2020-08-25 NOTE — PATIENT INSTRUCTIONS
Schedule an appointment with the dietician, please call or email Tawana Pugh RD at: 
 
[phone] [email or web address] 
 
 
Start tracking using [employer]  
 
Make your house a safe place with food and clear out the junk Minimize artificial sweetener (can increase sugar cravings) and use a fruit water diffuser or herbal tea (good cold too) Get back to having protein at every meal and some vegetables To suppress appetite: 
 - take 1/2 tab phentermine in the morning and 1/2 tab early afternoon. Avoid taking after 2 pm as will cause insomnia 
 
 - take the topamax bedtime to help minimize food cravings Plan on follow up in 1 month to assess response to the medication and monitor your blood pressure

## 2020-08-28 NOTE — PROGRESS NOTES
Chief Complaint   Patient presents with    Weight Loss       Delia Riley is 5 years status post Sleeve gastrectomy for treatment of morbid obesity  . Presents today for obesity management and weight gain. Patient has lost 42 lbs. Since surgery. Weight gain 20 lbs after a surprise pregnancy  2 years ago. Jose   137 lbs   Stable   149-150 lbs   Current 192 lbs       No reflux, night-time cough, heartburn or regurgitation. No abdominal pain   + thyroid disease     Snacks a lot and \"tries not to\"   Always kid food around and + sweets   Craves crackers and breads     Walking 2 miles every day   Sleep is good about 8 hours / night     + vitamins     Physical Exam  Visit Vitals  /82 (BP 1 Location: Left arm, BP Patient Position: Sitting)   Pulse 68   Temp 98.3 °F (36.8 °C) (Oral)   Resp 18   Ht 5' 5\" (1.651 m)   Wt 192 lb 3.2 oz (87.2 kg)   SpO2 98%   BMI 31.98 kg/m²     A + O x 3, looks well and healthy   Chest  CTA, unlabored   COR  RRR  ABD Soft,  NT/ND, no masses or hernias   EXT No edema; ambulating independently       ICD-10-CM ICD-9-CM    1. Obesity (BMI 30.0-34. 9)  E66.9 278.00 phentermine (ADIPEX-P) 37.5 mg tablet      topiramate (TOPAMAX) 25 mg tablet   2. Weight gain  R63.5 783.1 phentermine (ADIPEX-P) 37.5 mg tablet      topiramate (TOPAMAX) 25 mg tablet   3.  BMI 31.0-31.9,adult  Z68.31 V85.31 phentermine (ADIPEX-P) 37.5 mg tablet      topiramate (TOPAMAX) 25 mg tablet       Emmy Zamudio is 7 years s/p Sleeve gastrectomy for treatment of morbid obesity   Weight regain   Will do short course of phentermine   She has been on it in the past with + response   Reviewed side effects and follow up protocol   Add topamax at night for cravings   Stressed none of the above effective if does not make behavior changes   RD visit for BOT   Diet protein and produce   Continue vitamins and protein supplements   Activity daily walking and add strength training   Sleep hygiene reviewed   Follow-up in 1 month   Support group  Emmy Aranda verbalized understanding and questions were answered to the best of my knowledge and ability. Diet, activity and mindfulness educational materials were provided. 38 minutes spent in face to face with Mark Bird 44 > 50% counseling.

## 2020-11-05 ENCOUNTER — VIRTUAL VISIT (OUTPATIENT)
Dept: SURGERY | Age: 40
End: 2020-11-05
Payer: COMMERCIAL

## 2020-11-05 VITALS — BODY MASS INDEX: 29.62 KG/M2 | WEIGHT: 178 LBS

## 2020-11-05 DIAGNOSIS — R63.5 WEIGHT GAIN: ICD-10-CM

## 2020-11-05 DIAGNOSIS — E66.9 OBESITY (BMI 30.0-34.9): Primary | ICD-10-CM

## 2020-11-05 PROCEDURE — 99212 OFFICE O/P EST SF 10 MIN: CPT | Performed by: NURSE PRACTITIONER

## 2020-11-05 RX ORDER — TOPIRAMATE 25 MG/1
25 TABLET ORAL
Qty: 90 TAB | Refills: 1 | Status: SHIPPED | OUTPATIENT
Start: 2020-11-05

## 2020-11-05 RX ORDER — PHENTERMINE HYDROCHLORIDE 37.5 MG/1
TABLET ORAL
Qty: 45 TAB | Refills: 0 | Status: SHIPPED | OUTPATIENT
Start: 2020-11-05

## 2020-11-05 NOTE — PROGRESS NOTES
1. Have you been to the ER, urgent care clinic since your last visit? Hospitalized since your last visit? No     2. Have you seen or consulted any other health care providers outside of the [address] [place] [address] since your last visit? Include any pap smears or colon screening.  No

## 2020-11-06 NOTE — PROGRESS NOTES
I was in the office while conducting this encounter. Consent:  She and/or her healthcare decision maker is aware that this patient-initiated Telehealth encounter is a billable service, with coverage as determined by her insurance carrier. She is aware that she may receive a bill and has provided verbal consent to proceed: Yes    This virtual visit was conducted via [employer]. Pursuant to the emergency declaration under the [facility] waiver authority and the [employer] Act, this Virtual  Visit was conducted to reduce the patient's risk of exposure to COVID-19 and provide continuity of care for an established patient. Services were provided through a video synchronous discussion virtually to substitute for in-person clinic visit. Due to this being a TeleHealth evaluation, many elements of the physical examination are unable to be assessed. Total Time: minutes: 5-10 minutes. Chief Complaint   Patient presents with    Surgical Follow-up     8yrs s/p sleeve gastrectomy, down 14lbs/ [phone]      Lum Sour presents virtually today for obesity management. She is following up after being put on phentermine for appetite suppression. She has done well and lost 14 lbs with the addition of medication and getting back on track with diet and exercise. The topiramate has also been helpful and food thoughts have lessened   It bothered her taking it at night so she switched to morning with improved tolerance   No sleep disturbance   No chest pain or SOB   No palpitations   + dry mouth and some constipation     Walking and exercising every day   Following low carb plan     Visit Vitals  Wt 178 lb (80.7 kg)   BMI 29.62 kg/m²     Looks well   Breathing unlabored and regular   Ambulating independently       ICD-10-CM ICD-9-CM    1. Obesity (BMI 30.0-34. 9)  E66.9 278.00 phentermine (ADIPEX-P) 37.5 mg tablet topiramate (TOPAMAX) 25 mg tablet   2. Weight gain  R63.5 783.1 phentermine (ADIPEX-P) 37.5 mg tablet      topiramate (TOPAMAX) 25 mg tablet   3. BMI 29.0-29.9,adult  Z68.29 V85.25 phentermine (ADIPEX-P) 37.5 mg tablet      topiramate (TOPAMAX) 25 mg tablet     Doing well with addition of phentermine and Topamax with 8% weight loss   Will renew both topiramate and phentermine   Reviewed side effects and follow up   Phone or My chart follow up in 4-6 weeks with update including vitals and will renew x 1 more time, then 3 month hiatus off phentermine and can continue topiramate   Emmy Sheldon Credit verbalized understanding and questions were answered to the best of my knowledge and ability. Medication  educational materials were provided. Reviewed medication list, problem list and allergies. 8 minutes spent virtually with patient in directed conversation with the patient to include medical condition, assessment and plan.

## 2020-11-06 NOTE — PATIENT INSTRUCTIONS
We have started a Zoom Support Group and it will be the 2nd Thursday of the Month from 6-7 pm  
The next one is 11/12/20 6-7 pm  
You can access the link at [email or web address] Go to the Calendar tab and click on the date and it should go right to the link Schedule an appointment with the dietician, please call or email Miesha Hutton RD at: 
 
[phone] [email or web address] Follow up in about 4-6 weeks with an update and I will need your blood pressure, heart rate and weight Keep up the good work!

## 2021-08-10 NOTE — PROGRESS NOTES
Barbara Glover is a ,  41 y.o. female OTHER   who presents for her annual checkup. She is requesting a pap smear today. Menstrual status:    Her periods are minimal to nonexistent in flow. She is using essentially none pads or tampons per day, minimal to none using Mirena. She denies dysmenorrhea. She reports no premenstrual symptoms. The patient is not using HRT. Contraception:    The current method of family planning is IUD. Sexual history:    She  reports being sexually active and has had partner(s) who are Male. She reports using the following method of birth control/protection: I.U.D..    Medical conditions:    Since her last annual GYN exam about one year ago, she has had the following changes in her health history: none. Pap and Mammogram History:    Her most recent Pap smear was 3/16/2018 normal/HPV neg    The patient had her mammogram today in our office. Breast Cancer History/Substance Abuse:    She has a family history of breast cancer. Osteoporosis History:    Family history does not include a first or second degree relative with osteopenia or osteoporosis. Past Medical History:   Diagnosis Date    Abnormal Pap smear of cervix 2016    Neg pap +HPV     history     Acquired hypothyroidism     hyperthyroid, graves disease    Anxiety     recent diagnosis    Constipation     Encounter for insertion of mirena IUD 2018    Mirena placed    GERD (gastroesophageal reflux disease)     HSV-2 infection 2014    Infertility     clomid    Morbid obesity ([address]     PCOS (polycystic ovarian syndrome)     Sinusitis     Thyroid disease     Varicose vein      Past Surgical History:   Procedure Laterality Date    HX GASTRECTOMY  10/02/2013    sleeve gastrectomy     HX HEENT  orbital surgery    orbital decompression. Current Outpatient Medications   Medication Sig Dispense Refill    multivitamin (ONE A DAY) tablet Take 1 Tab by mouth daily.  LEVOXYL 175 mcg tablet TK 1 T PO QD  1    phentermine (ADIPEX-P) 37.5 mg tablet Half tablet at 7 am and half tablet 1 pm  Indications: weight loss management for an obese person (Patient not taking: Reported on 8/16/2021) 45 Tab 0    topiramate (TOPAMAX) 25 mg tablet Take 1 Tab by mouth daily (with breakfast). Indications: binge eating disorder (Patient not taking: Reported on 8/16/2021) 90 Tab 1     Allergies: Patient has no known allergies. Social History     Socioeconomic History    Marital status:      Spouse name: Not on file    Number of children: Not on file    Years of education: Not on file    Highest education level: Not on file   Occupational History    Not on file   Tobacco Use    Smoking status: Never Smoker    Smokeless tobacco: Never Used   Substance and Sexual Activity    Alcohol use: No    Drug use: No    Sexual activity: Yes     Partners: Male     Birth control/protection: I.U.D. Other Topics Concern    Not on file   Social History Narrative    Not on file     Social Determinants of Health     Financial Resource Strain:     Difficulty of Paying Living Expenses:    Food Insecurity:     Worried About Running Out of Food in the Last Year:     [address] in the Last Year:    Transportation Needs:     Lack of Transportation (Medical):  Lack of Transportation (Non-Medical):    Physical Activity:     Days of Exercise per Week:     Minutes of Exercise per Session:    Stress:     Feeling of Stress :    Social Connections:     Frequency of Communication with Friends and Family:     Frequency of Social Gatherings with Friends and Family:     Attends [employer] Services:     Active Member of Clubs or Organizations:     Attends Club or Organization Meetings:     Marital Status:    Intimate Partner Violence:     Fear of Current or Ex-Partner:     Emotionally Abused:     Physically Abused:     Sexually Abused:       Tobacco History:  reports that she has never smoked. She has never used smokeless tobacco.  Alcohol Abuse:  reports no history of alcohol use. Drug Abuse:  reports no history of drug use.   Patient Active Problem List   Diagnosis Code    Reflux [id number]    Constipation 564.0    Sinusitis J32.9    Morbid obesity ([facility] [address] E66.01    Polycystic ovarian syndrome E28.2    Infertility female     Urinary incontinence R32    Heartburn R12    SOB (shortness of breath) on exertion R06.02    Hypothyroidism E03.9    Low back pain M54.5    Pregnancy Z34.90    Active labor [id number]    Snoring R06.83    Dizziness R42    Fatigue R53.83    Supervision of elderly multigravida in second trimester O09.522         Review of Systems - History obtained from the patient  Constitutional: negative for weight loss, fever, night sweats  HEENT: negative for hearing loss, earache, congestion, snoring, sorethroat  CV: negative for chest pain, palpitations, edema  Resp: negative for cough, shortness of breath, wheezing  GI: negative for change in bowel habits, abdominal pain, black or bloody stools  : negative for frequency, dysuria, hematuria, vaginal discharge  MSK: negative for back pain, joint pain, muscle pain  Breast: negative for breast lumps, nipple discharge, galactorrhea  Skin :negative for itching, rash, hives  Neuro: negative for dizziness, headache, confusion, weakness  Psych: negative for anxiety, depression, change in mood  Heme/lymph: negative for bleeding, bruising, pallor    Physical Exam    Visit Vitals  /84   Ht 5' 4\" (1.626 m)   Wt 199 lb 9.6 oz (90.5 kg)   BMI 34.26 kg/m²     Constitutional  · Appearance: well-nourished, well developed, alert, in no acute distress    HENT  · Head and Face: appears normal    Neck  · Inspection/Palpation: normal appearance, no masses or tenderness  · Lymph Nodes: no lymphadenopathy present  · Thyroid: gland size normal, nontender, no nodules or masses present on palpation    Chest  · Respiratory Effort: breathing normal  · Auscultation: normal breath sounds    Cardiovascular  · Heart:  · Auscultation: regular rate and rhythm without murmur    Breasts  · Inspection of Breasts: breasts symmetrical, no skin changes, no discharge present, nipple appearance normal, no skin retraction present  · Palpation of Breasts and Axillae: no masses present on palpation, no breast tenderness  · Axillary Lymph Nodes: no lymphadenopathy present    Gastrointestinal  · Abdominal Examination: abdomen non-tender to palpation, normal bowel sounds, no masses present  · Liver and spleen: no hepatomegaly present, spleen not palpable  · Hernias: no hernias identified    Skin  · General Inspection: no rash, no lesions identified    Neurologic/Psychiatric  · Mental Status:  · Orientation: grossly oriented to person, place and time  · Mood and Affect: mood normal, affect appropriate    Genitourinary  · External Genitalia: normal appearance for age, no discharge present, no tenderness present, no inflammatory lesions present, no masses present, no atrophy present  · Vagina: normal vaginal vault without central or paravaginal defects, no discharge present, no inflammatory lesions present, no masses present  · Bladder: non-tender to palpation  · Urethra: appears normal  · Cervix: normal, string seen   · Uterus: normal size, shape and consistency  · Adnexa: no adnexal tenderness present, no adnexal masses present  · Perineum: perineum within normal limits, no evidence of trauma, no rashes or skin lesions present  · Anus: anus within normal limits, no hemorrhoids present  · Inguinal Lymph Nodes: no lymphadenopathy present    Assessment:  Routine gynecologic examination  Her current medical status is satisfactory with no evidence of significant gynecologic issues.   HPV pos 2016  Plan:  Counseled re: diet, exercise, healthy lifestyle  Return for yearly wellness visits  Rec annual mammogram  Pap/HPV

## 2021-08-16 ENCOUNTER — OFFICE VISIT (OUTPATIENT)
Dept: OBGYN CLINIC | Age: 41
End: 2021-08-16

## 2021-08-16 VITALS
HEIGHT: 64 IN | DIASTOLIC BLOOD PRESSURE: 84 MMHG | WEIGHT: 199.6 LBS | BODY MASS INDEX: 34.08 KG/M2 | SYSTOLIC BLOOD PRESSURE: 134 MMHG

## 2021-08-16 DIAGNOSIS — Z11.51 SPECIAL SCREENING EXAMINATION FOR HUMAN PAPILLOMAVIRUS (HPV): ICD-10-CM

## 2021-08-16 DIAGNOSIS — Z01.419 ENCOUNTER FOR GYNECOLOGICAL EXAMINATION (GENERAL) (ROUTINE) WITHOUT ABNORMAL FINDINGS: Primary | ICD-10-CM

## 2021-08-16 PROCEDURE — 99396 PREV VISIT EST AGE 40-64: CPT | Performed by: OBSTETRICS & GYNECOLOGY

## 2021-08-20 LAB
CYTOLOGIST CVX/VAG CYTO: NORMAL
CYTOLOGY CVX/VAG DOC CYTO: NORMAL
CYTOLOGY CVX/VAG DOC THIN PREP: NORMAL
CYTOLOGY HISTORY:: NORMAL
DX ICD CODE: NORMAL
HPV I/H RISK 4 DNA CVX QL PROBE+SIG AMP: NEGATIVE
Lab: NORMAL
OTHER STN SPEC: NORMAL
STAT OF ADQ CVX/VAG CYTO-IMP: NORMAL

## 2022-03-20 PROBLEM — O09.522 SUPERVISION OF ELDERLY MULTIGRAVIDA IN SECOND TRIMESTER: Status: ACTIVE | Noted: 2018-03-20

## 2022-09-12 NOTE — PROGRESS NOTES
Marvel Oconnor is a ,  35 y.o. female OTHER whose LMP was on 2022 who presents for her annual checkup. She has a lump in her right breast and under her left arm  Menstrual status:    Her periods are light, minimal to nonexistent in flow. She is using essentially none pads or tampons per day, minimal to none using Mirena. She denies dysmenorrhea. She reports no premenstrual symptoms. The patient is not using HRT. Contraception:    The current method of family planning is IUD. Sexual history:    She  reports being sexually active and has had partner(s) who are male. She reports using the following method of birth control/protection: I.U.D..    Medical conditions:    Since her last annual GYN exam about one year ago, she has had the following changes in her health history: none. Pap and Mammogram History:    Her most recent Pap smear was 2021 normal/HPV neg    The patient has not had a recent mammogram.    Breast Cancer History/Substance Abuse:    She has a family history of breast cancer. Osteoporosis History:    Family history does not include a first or second degree relative with osteopenia or osteoporosis. Past Medical History:   Diagnosis Date    Abnormal Pap smear of cervix 2016    Neg pap +HPV     history     Acquired hypothyroidism     hyperthyroid, graves disease    Anxiety     recent diagnosis    Constipation     Encounter for insertion of mirena IUD 2018    Mirena placed    GERD (gastroesophageal reflux disease)     HSV-2 infection 2014    Infertility     clomid    Morbid obesity ([facility] [address]     PCOS (polycystic ovarian syndrome)     Sinusitis     Thyroid disease     Varicose vein      Past Surgical History:   Procedure Laterality Date    HX GASTRECTOMY  10/02/2013    sleeve gastrectomy     HX HEENT  orbital surgery    orbital decompression.       Current Outpatient Medications   Medication Sig Dispense Refill    phentermine (ADIPEX-P) 37.5 mg tablet Half tablet at 7 am and half tablet 1 pm  Indications: weight loss management for an obese person (Patient not taking: Reported on 8/16/2021) 45 Tab 0    topiramate (TOPAMAX) 25 mg tablet Take 1 Tab by mouth daily (with breakfast). Indications: binge eating disorder (Patient not taking: Reported on 8/16/2021) 90 Tab 1    multivitamin (ONE A DAY) tablet Take 1 Tab by mouth daily. LEVOXYL 175 mcg tablet TK 1 T PO QD  1     Allergies: Patient has no known allergies. Social History     Socioeconomic History    Marital status:      Spouse name: Not on file    Number of children: Not on file    Years of education: Not on file    Highest education level: Not on file   Occupational History    Not on file   Tobacco Use    Smoking status: Never    Smokeless tobacco: Never   Substance and Sexual Activity    Alcohol use: No    Drug use: No    Sexual activity: Yes     Partners: Male     Birth control/protection: I.U.D. Other Topics Concern    Not on file   Social History Narrative    Not on file     Social Determinants of Health     Financial Resource Strain: Not on file   Food Insecurity: Not on file   Transportation Needs: Not on file   Physical Activity: Not on file   Stress: Not on file   Social Connections: Not on file   Intimate Partner Violence: Not on file   Housing Stability: Not on file     Tobacco History:  reports that she has never smoked. She has never used smokeless tobacco.  Alcohol Abuse:  reports no history of alcohol use. Drug Abuse:  reports no history of drug use.   Patient Active Problem List   Diagnosis Code    Reflux [id number]    Constipation 564.0    Sinusitis J32.9    Morbid obesity ([facility]) E66.01    Polycystic ovarian syndrome E28.2    Infertility female     Urinary incontinence R32    Heartburn R12    SOB (shortness of breath) on exertion R06.02    Hypothyroidism E03.9    Low back pain M54.50    Pregnancy Z34.90    Active labor [id number]    Snoring R06.83    Dizziness R42    Fatigue R53.83    Supervision of elderly multigravida in second trimester O09.522         Review of Systems - History obtained from the patient  Constitutional: negative for weight loss, fever, night sweats  HEENT: negative for hearing loss, earache, congestion, snoring, sorethroat  CV: negative for chest pain, palpitations, edema  Resp: negative for cough, shortness of breath, wheezing  GI: negative for change in bowel habits, abdominal pain, black or bloody stools  : negative for frequency, dysuria, hematuria, vaginal discharge  MSK: negative for back pain, joint pain, muscle pain  Breast: negative for breast lumps, nipple discharge, galactorrhea  Skin :negative for itching, rash, hives  Neuro: negative for dizziness, headache, confusion, weakness  Psych: negative for anxiety, depression, change in mood  Heme/lymph: negative for bleeding, bruising, pallor    Physical Exam    Visit Vitals  /80   Wt 202 lb (91.6 kg)   LMP 09/01/2022   BMI 34.67 kg/m²     Constitutional  Appearance: well-nourished, well developed, alert, in no acute distress    HENT  Head and Face: appears normal    Neck  Inspection/Palpation: normal appearance, no masses or tenderness  Lymph Nodes: no lymphadenopathy present  Thyroid: gland size normal, nontender, no nodules or masses present on palpation    Chest  Respiratory Effort: breathing normal  Auscultation: normal breath sounds    Cardiovascular  Heart:   Auscultation: regular rate and rhythm without murmur    Breasts  Inspection of Breasts: breasts symmetrical, no skin changes, no discharge present, nipple appearance normal, no skin retraction present  Palpation of Breasts and Axillae: no masses present on palpation, no breast tenderness  Axillary Lymph Nodes: no lymphadenopathy present    Gastrointestinal  Abdominal Examination: abdomen non-tender to palpation, normal bowel sounds, no masses present  Liver and spleen: no hepatomegaly present, spleen not palpable  Hernias: no hernias identified    Skin  General Inspection: no rash, no lesions identified    Neurologic/Psychiatric  Mental Status:  Orientation: grossly oriented to person, place and time  Mood and Affect: mood normal, affect appropriate    Genitourinary  External Genitalia: normal appearance for age, no discharge present, no tenderness present, no inflammatory lesions present, no masses present, no atrophy present  Vagina: normal vaginal vault without central or paravaginal defects, no discharge present, no inflammatory lesions present, no masses present  Bladder: non-tender to palpation  Urethra: appears normal  Cervix: normal, string seen   Uterus: normal size, shape and consistency  Adnexa: no adnexal tenderness present, no adnexal masses present  Perineum: perineum within normal limits, no evidence of trauma, no rashes or skin lesions present  Anus: anus within normal limits, no hemorrhoids present  Inguinal Lymph Nodes: no lymphadenopathy present    Assessment:  Routine gynecologic examination  Her current medical status is satisfactory with no evidence of significant gynecologic issues.   No palpable breast lump or adnenopathy  Plan:  Counseled re: diet, exercise, healthy lifestyle  Return for yearly wellness visits  Rec annual mammogram  RTO if feels lump again/enlarged

## 2022-09-13 ENCOUNTER — OFFICE VISIT (OUTPATIENT)
Dept: OBGYN CLINIC | Age: 42
End: 2022-09-13
Payer: COMMERCIAL

## 2022-09-13 VITALS — BODY MASS INDEX: 34.67 KG/M2 | SYSTOLIC BLOOD PRESSURE: 126 MMHG | WEIGHT: 202 LBS | DIASTOLIC BLOOD PRESSURE: 80 MMHG

## 2022-09-13 DIAGNOSIS — Z01.419 ENCOUNTER FOR GYNECOLOGICAL EXAMINATION (GENERAL) (ROUTINE) WITHOUT ABNORMAL FINDINGS: Primary | ICD-10-CM

## 2022-09-13 PROCEDURE — 99396 PREV VISIT EST AGE 40-64: CPT | Performed by: OBSTETRICS & GYNECOLOGY

## 2023-05-18 RX ORDER — LEVOTHYROXINE SODIUM 175 UG/1
1 TABLET ORAL DAILY
COMMUNITY
Start: 2019-01-16

## 2023-05-18 RX ORDER — PHENTERMINE HYDROCHLORIDE 37.5 MG/1
TABLET ORAL
COMMUNITY
Start: 2020-11-05

## 2023-05-18 RX ORDER — TOPIRAMATE 25 MG/1
25 TABLET ORAL
COMMUNITY
Start: 2020-11-05

## 2023-10-12 ENCOUNTER — TRANSCRIBE ORDERS (OUTPATIENT)
Facility: HOSPITAL | Age: 43
End: 2023-10-12

## 2023-10-12 DIAGNOSIS — Z12.31 SCREENING MAMMOGRAM, ENCOUNTER FOR: Primary | ICD-10-CM

## 2023-10-24 ENCOUNTER — OFFICE VISIT (OUTPATIENT)
Age: 43
End: 2023-10-24
Payer: COMMERCIAL

## 2023-10-24 VITALS — BODY MASS INDEX: 28.6 KG/M2 | WEIGHT: 166.6 LBS | SYSTOLIC BLOOD PRESSURE: 99 MMHG | DIASTOLIC BLOOD PRESSURE: 67 MMHG

## 2023-10-24 DIAGNOSIS — Z01.419 ENCOUNTER FOR GYNECOLOGICAL EXAMINATION (GENERAL) (ROUTINE) WITHOUT ABNORMAL FINDINGS: Primary | ICD-10-CM

## 2023-10-24 PROCEDURE — 99396 PREV VISIT EST AGE 40-64: CPT | Performed by: OBSTETRICS & GYNECOLOGY

## 2023-10-24 NOTE — PROGRESS NOTES
Catherine Crow is a 38 y.o. female returns for an annual exam     Chief Complaint   Patient presents with    Annual Exam       Patient's last menstrual period was 10/01/2023 (approximate). Her periods are moderate in flow and usually regular with a 26-32 day interval with 3-7 day duration. She does not have dysmenorrhea. Problems: problems - would like a pap smear this year; has family history that concerns her. Also wants to discuss effectiveness of IUD, has recently began having periods again. Birth Control: IUD. Mirena placed 12/7/2018  Last Pap: 8/16/2021; NILM/ HPV-  She does not have a history of SAMINA 2, 3 or cervical cancer. Last Mammogram: scheduled for 2 weeks; 9/15/2022 negative          Examination chaperoned by Niranjan Dickens LPN.
present  Anus: anus within normal limits, no hemorrhoids present  Inguinal Lymph Nodes: no lymphadenopathy present    Assessment:  Routine gynecologic examination  Her current medical status is satisfactory with no evidence of significant gynecologic issues.     Plan:  Counseled re: diet, exercise, healthy lifestyle  Return for yearly wellness visits  Rec annual mammogram  Pap next year  Switch out IUD due to bleeding

## 2023-11-03 ENCOUNTER — HOSPITAL ENCOUNTER (OUTPATIENT)
Facility: HOSPITAL | Age: 43
Discharge: HOME | End: 2023-11-03
Attending: OBSTETRICS & GYNECOLOGY
Payer: COMMERCIAL

## 2023-11-03 VITALS — HEIGHT: 64 IN | BODY MASS INDEX: 28.34 KG/M2 | WEIGHT: 166 LBS

## 2023-11-03 DIAGNOSIS — Z12.31 SCREENING MAMMOGRAM, ENCOUNTER FOR: ICD-10-CM

## 2023-11-03 PROCEDURE — 77063 BREAST TOMOSYNTHESIS BI: CPT

## 2024-01-10 NOTE — PROGRESS NOTES
Marie Bartholomew is a 43 y.o. female presents for a problem visit.    Chief Complaint   Patient presents with    IUD replacement       No LMP recorded. (Menstrual status: IUD).      Device number [id number], expiration date 5/1/2025    Chart reviewed for the following:   Bruna WREN MA, have reviewed the History, Physical and updated the Allergic reactions for Marie Bartholomew     TIME OUT performed immediately prior to start of procedure:   Bruna WREN MA, have performed the following reviews on Marie Bartholomew prior to the start of the procedure:            * Patient was identified by name and date of birth   * Agreement on procedure being performed was verified  * Risks and Benefits explained to the patient  * Procedure site verified and marked as necessary  * Patient was positioned for comfort  * Consent was signed and verified     Time: 11:00    Date of procedure: 1/18/2024    Procedure performed by:  Selina Rincon MD       How tolerated by patient: tolerated the procedure well with no complications    Post Procedural Pain Scale: 2 - Hurts Little Bit    Comments: none    Examination chaperoned by Bruna Lepe MA.

## 2024-01-18 ENCOUNTER — PROCEDURE VISIT (OUTPATIENT)
Age: 44
End: 2024-01-18
Payer: COMMERCIAL

## 2024-01-18 VITALS — BODY MASS INDEX: 28.49 KG/M2 | DIASTOLIC BLOOD PRESSURE: 80 MMHG | WEIGHT: 166 LBS | SYSTOLIC BLOOD PRESSURE: 113 MMHG

## 2024-01-18 DIAGNOSIS — Z30.433 ENCOUNTER FOR REMOVAL AND REINSERTION OF IUD: Primary | ICD-10-CM

## 2024-01-18 PROCEDURE — 58300 INSERT INTRAUTERINE DEVICE: CPT | Performed by: OBSTETRICS & GYNECOLOGY

## 2024-01-18 PROCEDURE — 58301 REMOVE INTRAUTERINE DEVICE: CPT | Performed by: OBSTETRICS & GYNECOLOGY

## 2024-01-18 SDOH — ECONOMIC STABILITY: INCOME INSECURITY: HOW HARD IS IT FOR YOU TO PAY FOR THE VERY BASICS LIKE FOOD, HOUSING, MEDICAL CARE, AND HEATING?: PATIENT DECLINED

## 2024-01-18 SDOH — ECONOMIC STABILITY: FOOD INSECURITY: WITHIN THE PAST 12 MONTHS, YOU WORRIED THAT YOUR FOOD WOULD RUN OUT BEFORE YOU GOT MONEY TO BUY MORE.: PATIENT DECLINED

## 2024-01-18 SDOH — ECONOMIC STABILITY: FOOD INSECURITY: WITHIN THE PAST 12 MONTHS, THE FOOD YOU BOUGHT JUST DIDN'T LAST AND YOU DIDN'T HAVE MONEY TO GET MORE.: PATIENT DECLINED

## 2024-01-18 SDOH — ECONOMIC STABILITY: HOUSING INSECURITY
IN THE LAST 12 MONTHS, WAS THERE A TIME WHEN YOU DID NOT HAVE A STEADY PLACE TO SLEEP OR SLEPT IN A SHELTER (INCLUDING NOW)?: PATIENT DECLINED

## 2024-01-18 ASSESSMENT — PATIENT HEALTH QUESTIONNAIRE - PHQ9
SUM OF ALL RESPONSES TO PHQ9 QUESTIONS 1 & 2: 0
SUM OF ALL RESPONSES TO PHQ QUESTIONS 1-9: 0
1. LITTLE INTEREST OR PLEASURE IN DOING THINGS: 0
SUM OF ALL RESPONSES TO PHQ QUESTIONS 1-9: 0
SUM OF ALL RESPONSES TO PHQ QUESTIONS 1-9: 0
2. FEELING DOWN, DEPRESSED OR HOPELESS: 0
SUM OF ALL RESPONSES TO PHQ QUESTIONS 1-9: 0

## 2024-01-18 NOTE — PROGRESS NOTES
IUD REPLACEMENT    Indications for Removal:  Marie Bartholomew is a ,  43 y.o. female Other whose No LMP recorded. (Menstrual status: IUD).  was on . who presents today for IUD replacement. Her current IUD was placed 5 years ago. She has not had any problems with the IUD.  She requests replacement of the IUD because the IUD effectiveness has . The IUD removal procedure was discussed with the patient and she had no further questions.   Procedure:   The patient was placed in a dorsal lithotomy position and appropriately draped. On bimanual exam the uterus was anterior and normal in size with no tenderness present. A speculum exam was performed and the cervix was visualized. The cervix was prepped with zephiran solution. The IUD string was visualized. Using ring forceps , the string was grasped and the IUD removed intact. The IUD was shown to the patient.     IUD INSERTION  Indications:  The risks, benefits and alternatives of IUD insertion were discussed in detail.  She also has reviewed Mirena information. She has elected to proceed with the insertion today and she states she has no further questions. A urine pregnancy test was negative today.   Procedure:  The pelvic exam revealed normal external genitalia. On bimanual exam the uterus was anteverted and normal in size with no tenderness present. A speculum was inserted into the vagina and the cervix was visualized. The cervix was prepped with zephiran solution. The anterior lip of the cervix was grasped with a single toothed tenaculum. The uterus was sounded with a Mcdermott sound to 7.5 centimeters. A Mirena was then inserted without difficulty. The string was cut to 3 centimeters.She experienced a mild amount of cramping.   Post Procedure Status: She tolerated the procedure well.    Patient advised risk of expulsion and bleeding  Follow-up in 4 weeks

## 2024-03-01 ENCOUNTER — OFFICE VISIT (OUTPATIENT)
Age: 44
End: 2024-03-01
Payer: COMMERCIAL

## 2024-03-01 VITALS — SYSTOLIC BLOOD PRESSURE: 114 MMHG | DIASTOLIC BLOOD PRESSURE: 75 MMHG | BODY MASS INDEX: 29.66 KG/M2 | WEIGHT: 172.8 LBS

## 2024-03-01 DIAGNOSIS — Z30.431 IUD CHECK UP: ICD-10-CM

## 2024-03-01 DIAGNOSIS — N92.6 IRREGULAR MENSES: Primary | ICD-10-CM

## 2024-03-01 PROCEDURE — 99213 OFFICE O/P EST LOW 20 MIN: CPT | Performed by: OBSTETRICS & GYNECOLOGY

## 2024-03-01 NOTE — PROGRESS NOTES
Marie Bartholomew is a 43 y.o. female presents for a problem visit.    Chief Complaint   Patient presents with    Ultrasound     IUD check       No LMP recorded. (Menstrual status: IUD).  Birth Control: IUD. Mirena placed 1/18/2024  Last Pap: 8/16/2021 NILM/ HPV-  Patient reports she is still spotting and having hot flashes  The patient is reporting having: Ultrasound Follow Up for IUD check  TV ULTRASOUND PERFORMED. UTERUS IS ANTEVERTED, NORMAL IN SIZE AND ECHOGENICITY. ENDOMETRIUM MEASURES 3-4MM IN THICKNESS. NO EVIDENCE OF MASSES OR ABNORMALITIES ARE SEEN. IUD IS SEEN IN THE PROPER POSITION WITHIN THE ENDOMETRIAL CAVITY IN THE UTERINE FUNDUS. RIGHT OVARY APPEARS WITHIN NORMAL LIMITS. LEFT OVARY APPEARS WITHIN NORMAL LIMITS. FREE FLUID SEEN IN THE CDS.     Examination chaperoned by Monique Lr LPN.  
Marie Bartholomew is a 43 y.o. female presents for a problem visit.    Chief Complaint   Patient presents with    Ultrasound     IUD check     Patient's last menstrual period was 02/04/2024 (exact date).  Birth Control: IUD. Mirena placed 1/18/2024  Last Pap: 8/16/2021 NILM/ HPV-    The patient is reporting having: Ultrasound Follow Up for IUD check      Examination chaperoned by Evelin Vinson LPN.  
lesions present, no masses present  Bladder: non-tender to palpation  Urethra: appears normal  Cervix: normal, string seen   Uterus: normal size, shape and consistency  Adnexa: no adnexal tenderness present, no adnexal masses present  Perineum: perineum within normal limits, no evidence of trauma, no rashes or skin lesions present  Anus: anus within normal limits, no hemorrhoids present  Inguinal Lymph Nodes: no lymphadenopathy present    Skin  General Inspection: no rash, no lesions identified    Neurologic/Psychiatric  Mental Status:  Orientation: grossly oriented to person, place and time  Mood and Affect: mood normal, affect appropriate      ASSESSMENT:    ICD-10-CM    1. Irregular menses  N92.6       2. IUD check up  Z30.431           PLAN:  Irregular bleeding should improve with time if it does not she should let me know  She will follow-up with her primary care to have her thyroid checked      RTO prn if symptoms persist or worsen.  Instructions given to pt.  Handouts given to pt.    Today, 03/01/24, I personally spent more than 15 minutes in review of records, documentation,  and face to face counseling with the patient discussing the diagnosis, plan of care and importance of compliance with the treatment plan and performing an exam as well as ordering any appropropriate labs, procedures, or medications.